# Patient Record
Sex: FEMALE | Race: WHITE
[De-identification: names, ages, dates, MRNs, and addresses within clinical notes are randomized per-mention and may not be internally consistent; named-entity substitution may affect disease eponyms.]

---

## 2018-11-08 ENCOUNTER — HOSPITAL ENCOUNTER (INPATIENT)
Dept: HOSPITAL 12 - REHAB | Age: 83
LOS: 13 days | Discharge: HOME | DRG: 948 | End: 2018-11-21
Attending: PHYSICAL MEDICINE & REHABILITATION | Admitting: PHYSICAL MEDICINE & REHABILITATION
Payer: MEDICARE

## 2018-11-08 VITALS — WEIGHT: 103 LBS | HEIGHT: 58 IN | BODY MASS INDEX: 21.62 KG/M2

## 2018-11-08 VITALS — DIASTOLIC BLOOD PRESSURE: 79 MMHG | SYSTOLIC BLOOD PRESSURE: 150 MMHG

## 2018-11-08 VITALS — DIASTOLIC BLOOD PRESSURE: 62 MMHG | SYSTOLIC BLOOD PRESSURE: 128 MMHG

## 2018-11-08 DIAGNOSIS — R53.81: Primary | ICD-10-CM

## 2018-11-08 DIAGNOSIS — M85.80: ICD-10-CM

## 2018-11-08 DIAGNOSIS — K44.9: ICD-10-CM

## 2018-11-08 DIAGNOSIS — M19.90: ICD-10-CM

## 2018-11-08 DIAGNOSIS — J84.9: ICD-10-CM

## 2018-11-08 DIAGNOSIS — M41.9: ICD-10-CM

## 2018-11-08 DIAGNOSIS — E03.9: ICD-10-CM

## 2018-11-08 DIAGNOSIS — I11.0: ICD-10-CM

## 2018-11-08 DIAGNOSIS — M81.0: ICD-10-CM

## 2018-11-08 DIAGNOSIS — H91.90: ICD-10-CM

## 2018-11-08 DIAGNOSIS — Z80.7: ICD-10-CM

## 2018-11-08 DIAGNOSIS — M54.5: ICD-10-CM

## 2018-11-08 DIAGNOSIS — Z90.11: ICD-10-CM

## 2018-11-08 DIAGNOSIS — Z87.891: ICD-10-CM

## 2018-11-08 DIAGNOSIS — I95.9: ICD-10-CM

## 2018-11-08 DIAGNOSIS — K21.9: ICD-10-CM

## 2018-11-08 DIAGNOSIS — Z83.3: ICD-10-CM

## 2018-11-08 DIAGNOSIS — E86.0: ICD-10-CM

## 2018-11-08 DIAGNOSIS — Z79.891: ICD-10-CM

## 2018-11-08 DIAGNOSIS — Z80.8: ICD-10-CM

## 2018-11-08 DIAGNOSIS — G43.909: ICD-10-CM

## 2018-11-08 DIAGNOSIS — Z82.0: ICD-10-CM

## 2018-11-08 DIAGNOSIS — I67.2: ICD-10-CM

## 2018-11-08 DIAGNOSIS — K58.0: ICD-10-CM

## 2018-11-08 DIAGNOSIS — Z96.652: ICD-10-CM

## 2018-11-08 DIAGNOSIS — G89.4: ICD-10-CM

## 2018-11-08 DIAGNOSIS — Z88.1: ICD-10-CM

## 2018-11-08 DIAGNOSIS — Z85.3: ICD-10-CM

## 2018-11-08 DIAGNOSIS — R32: ICD-10-CM

## 2018-11-08 DIAGNOSIS — Z90.710: ICD-10-CM

## 2018-11-08 DIAGNOSIS — E83.51: ICD-10-CM

## 2018-11-08 DIAGNOSIS — Z88.2: ICD-10-CM

## 2018-11-08 DIAGNOSIS — R53.1: ICD-10-CM

## 2018-11-08 DIAGNOSIS — R73.9: ICD-10-CM

## 2018-11-08 DIAGNOSIS — I50.30: ICD-10-CM

## 2018-11-08 DIAGNOSIS — I35.8: ICD-10-CM

## 2018-11-08 DIAGNOSIS — Z91.018: ICD-10-CM

## 2018-11-08 DIAGNOSIS — J98.4: ICD-10-CM

## 2018-11-08 PROCEDURE — A9150 MISC/EXPER NON-PRESCRIPT DRU: HCPCS

## 2018-11-08 RX ADMIN — IBUPROFEN PRN MG: 200 TABLET, SUGAR COATED ORAL at 19:30

## 2018-11-08 RX ADMIN — FOLIC ACID SCH MG: 1 TABLET ORAL at 20:36

## 2018-11-08 RX ADMIN — IBUPROFEN PRN MG: 200 TABLET, SUGAR COATED ORAL at 23:18

## 2018-11-08 RX ADMIN — LOSARTAN POTASSIUM SCH MG: 50 TABLET, FILM COATED ORAL at 20:37

## 2018-11-08 RX ADMIN — Medication SCH CAP: at 20:37

## 2018-11-08 NOTE — NUR
Admitted from home, per wheelchair with diagnosis of Chronic pain syndrome. Awake, alert x4. 
With pain lower back rated as 8/10, chronic. Not in any form of distress. No SOB or chest 
pains. Routine admission care done. Informed Dr. Lombardo of admission. Ordered AM labs and 
Ibuprofen 200 mg q4prn. Dr. Alex Young informed of admission, obtained medication 
reconciliation. , continued home medications and ordered one PM dose for Cozaar and 
Cymbalta. Patient informed and oriented about unit and equipment.

## 2018-11-08 NOTE — NUR
Patient received in bed. AAO X4. No acute distress or SOB noted. Has hearing difficulty. 
Able to makes needs known. On room air. Brief assessment done. VS stable. No Complain of 
pain at this time, but asked for around the clock pain medication to prevent pain. Safety 
measures maintained. Bed in low position, brake on, side rails upx2. Call light and personal 
belongings within reach. Continue to monitor.

## 2018-11-09 VITALS — SYSTOLIC BLOOD PRESSURE: 133 MMHG | DIASTOLIC BLOOD PRESSURE: 76 MMHG

## 2018-11-09 VITALS — SYSTOLIC BLOOD PRESSURE: 126 MMHG | DIASTOLIC BLOOD PRESSURE: 72 MMHG

## 2018-11-09 VITALS — DIASTOLIC BLOOD PRESSURE: 74 MMHG | SYSTOLIC BLOOD PRESSURE: 152 MMHG

## 2018-11-09 VITALS — SYSTOLIC BLOOD PRESSURE: 130 MMHG | DIASTOLIC BLOOD PRESSURE: 54 MMHG

## 2018-11-09 LAB
ALP SERPL-CCNC: 73 U/L (ref 50–136)
ALT SERPL W/O P-5'-P-CCNC: 15 U/L (ref 14–59)
AST SERPL-CCNC: 16 U/L (ref 15–37)
BASOPHILS # BLD AUTO: 0.1 K/UL (ref 0–8)
BASOPHILS NFR BLD AUTO: 0.7 % (ref 0–2)
BILIRUB SERPL-MCNC: 0.4 MG/DL (ref 0.2–1)
BUN SERPL-MCNC: 21 MG/DL (ref 7–18)
CHLORIDE SERPL-SCNC: 104 MMOL/L (ref 98–107)
CO2 SERPL-SCNC: 25 MMOL/L (ref 21–32)
CREAT SERPL-MCNC: 0.8 MG/DL (ref 0.6–1.3)
EOSINOPHIL # BLD AUTO: 0.1 K/UL (ref 0–0.7)
EOSINOPHIL NFR BLD AUTO: 1.3 % (ref 0–7)
GLUCOSE SERPL-MCNC: 113 MG/DL (ref 74–106)
HCT VFR BLD AUTO: 37.5 % (ref 31.2–41.9)
HGB BLD-MCNC: 12.9 G/DL (ref 10.9–14.3)
LYMPHOCYTES # BLD AUTO: 2 K/UL (ref 20–40)
LYMPHOCYTES NFR BLD AUTO: 19.7 % (ref 20.5–51.5)
MCH RBC QN AUTO: 29.1 UUG (ref 24.7–32.8)
MCHC RBC AUTO-ENTMCNC: 34 G/DL (ref 32.3–35.6)
MCV RBC AUTO: 84.9 FL (ref 75.5–95.3)
MONOCYTES # BLD AUTO: 0.7 K/UL (ref 2–10)
MONOCYTES NFR BLD AUTO: 6.8 % (ref 0–11)
NEUTROPHILS # BLD AUTO: 7.2 K/UL (ref 1.8–8.9)
NEUTROPHILS NFR BLD AUTO: 71.5 % (ref 38.5–71.5)
PLATELET # BLD AUTO: 423 K/UL (ref 179–408)
POTASSIUM SERPL-SCNC: 3.9 MMOL/L (ref 3.5–5.1)
RBC # BLD AUTO: 4.42 MIL/UL (ref 3.63–4.92)
WBC # BLD AUTO: 10 K/UL (ref 3.8–11.8)
WS STN SPEC: 7 G/DL (ref 6.4–8.2)

## 2018-11-09 RX ADMIN — Medication SCH MG: at 21:05

## 2018-11-09 RX ADMIN — IBUPROFEN PRN MG: 200 TABLET, SUGAR COATED ORAL at 03:30

## 2018-11-09 RX ADMIN — FOLIC ACID SCH MG: 1 TABLET ORAL at 08:39

## 2018-11-09 RX ADMIN — LIDOCAINE SCH PATCH: 50 PATCH CUTANEOUS at 08:40

## 2018-11-09 RX ADMIN — ACETAMINOPHEN SCH MG: 325 TABLET ORAL at 16:08

## 2018-11-09 RX ADMIN — IBUPROFEN PRN MG: 200 TABLET, SUGAR COATED ORAL at 12:54

## 2018-11-09 RX ADMIN — LOSARTAN POTASSIUM SCH MG: 50 TABLET, FILM COATED ORAL at 08:39

## 2018-11-09 RX ADMIN — Medication SCH MCG: at 06:12

## 2018-11-09 RX ADMIN — IBUPROFEN PRN MG: 200 TABLET, SUGAR COATED ORAL at 18:03

## 2018-11-09 RX ADMIN — Medication SCH TAB: at 08:40

## 2018-11-09 RX ADMIN — LOSARTAN POTASSIUM SCH MG: 50 TABLET, FILM COATED ORAL at 21:05

## 2018-11-09 RX ADMIN — Medication SCH CAP: at 08:36

## 2018-11-09 RX ADMIN — PREGABALIN SCH MG: 25 CAPSULE ORAL at 21:05

## 2018-11-09 RX ADMIN — IBUPROFEN PRN MG: 200 TABLET, SUGAR COATED ORAL at 08:51

## 2018-11-09 RX ADMIN — Medication SCH CAP: at 16:08

## 2018-11-09 RX ADMIN — Medication SCH CAP: at 13:05

## 2018-11-09 RX ADMIN — Medication SCH MG: at 08:39

## 2018-11-09 RX ADMIN — PANTOPRAZOLE SODIUM SCH MG: 40 TABLET, DELAYED RELEASE ORAL at 06:12

## 2018-11-09 RX ADMIN — FOLIC ACID SCH MG: 1 TABLET ORAL at 16:09

## 2018-11-09 NOTE — NUR
Received patient awake, alert x4. With chronic pain over upper and lower back. Not in any 
form of distress. With occasional dyspnea. No chest pains noted. Morning care done. 
Encouraged to call for needs.

## 2018-11-09 NOTE — NUR
Seen and examined by Dr Young, informed Dr about patient requesting for sleeping 
pills. . said he will put ordered in and discontinues Mobic medication. Seen and examined 
by Dr Wang, Chest X ray done.

## 2018-11-09 NOTE — NUR
Recieved patient lying on bed .Awake Alert Oriented x3 .Able to make needs known .Verbally 
responsive to the staff. No sob no distress noted. V/S taken and recorded with in normal 
range.Due meds given as order . Tolerated well.Continent both bladder and bowel function 
.Assisted to go to the bathroom. Kept clean and dry. Call light and other belongings with in 
reach at all times .All needs met and attended.

## 2018-11-10 VITALS — SYSTOLIC BLOOD PRESSURE: 101 MMHG | DIASTOLIC BLOOD PRESSURE: 40 MMHG

## 2018-11-10 VITALS — DIASTOLIC BLOOD PRESSURE: 63 MMHG | SYSTOLIC BLOOD PRESSURE: 120 MMHG

## 2018-11-10 VITALS — SYSTOLIC BLOOD PRESSURE: 129 MMHG | DIASTOLIC BLOOD PRESSURE: 72 MMHG

## 2018-11-10 VITALS — DIASTOLIC BLOOD PRESSURE: 66 MMHG | SYSTOLIC BLOOD PRESSURE: 125 MMHG

## 2018-11-10 LAB
BASE EXCESS BLDA CALC-SCNC: -5.4 MMOL/L
HCO3 BLDA-SCNC: 17.6 MMOL/L
HGB BLDA OXIMETRY-MCNC: 13.5 G/DL (ref 12–16)
INHALED O2 CONCENTRATION: 21 %
PCO2 TEMP ADJ BLDA: 28 MMHG (ref 35–45)
PH TEMP ADJ BLDA: 7.42 [PH] (ref 7.35–7.45)
PO2 TEMP ADJ BLDA: 88.2 MMHG (ref 75–100)
SPECIMEN DRAWN FROM PATIENT: (no result)
VENTILATION MODE VENT: (no result)

## 2018-11-10 RX ADMIN — ACETAMINOPHEN SCH MG: 325 TABLET ORAL at 16:48

## 2018-11-10 RX ADMIN — FOLIC ACID SCH MG: 1 TABLET ORAL at 08:19

## 2018-11-10 RX ADMIN — PREGABALIN SCH MG: 25 CAPSULE ORAL at 21:13

## 2018-11-10 RX ADMIN — Medication PRN MG: at 20:58

## 2018-11-10 RX ADMIN — LOSARTAN POTASSIUM SCH MG: 50 TABLET, FILM COATED ORAL at 08:19

## 2018-11-10 RX ADMIN — Medication SCH MG: at 08:18

## 2018-11-10 RX ADMIN — LIDOCAINE SCH PATCH: 50 PATCH CUTANEOUS at 08:20

## 2018-11-10 RX ADMIN — Medication SCH CAP: at 08:20

## 2018-11-10 RX ADMIN — IBUPROFEN PRN MG: 200 TABLET, SUGAR COATED ORAL at 16:48

## 2018-11-10 RX ADMIN — Medication PRN MG: at 00:17

## 2018-11-10 RX ADMIN — PANTOPRAZOLE SODIUM SCH MG: 40 TABLET, DELAYED RELEASE ORAL at 06:04

## 2018-11-10 RX ADMIN — Medication SCH CAP: at 16:48

## 2018-11-10 RX ADMIN — Medication SCH TAB: at 08:18

## 2018-11-10 RX ADMIN — ACETAMINOPHEN SCH MG: 325 TABLET ORAL at 08:18

## 2018-11-10 RX ADMIN — Medication SCH MG: at 20:58

## 2018-11-10 RX ADMIN — PREGABALIN SCH MG: 25 CAPSULE ORAL at 13:08

## 2018-11-10 RX ADMIN — LOSARTAN POTASSIUM SCH MG: 50 TABLET, FILM COATED ORAL at 20:58

## 2018-11-10 RX ADMIN — IBUPROFEN PRN MG: 200 TABLET, SUGAR COATED ORAL at 10:24

## 2018-11-10 RX ADMIN — FOLIC ACID SCH MG: 1 TABLET ORAL at 16:48

## 2018-11-10 RX ADMIN — Medication SCH CAP: at 13:08

## 2018-11-10 RX ADMIN — Medication SCH MCG: at 06:04

## 2018-11-10 RX ADMIN — PREGABALIN SCH MG: 25 CAPSULE ORAL at 06:04

## 2018-11-10 RX ADMIN — ACETAMINOPHEN SCH MG: 325 TABLET ORAL at 13:08

## 2018-11-10 NOTE — NUR
Received pt at the beginning of shift sleeping comfortably in bed. Aroused easily when 
alerted by name. AAO x4. No acute distress noted. No c/o pain or discomfort. Held Cozaar due 
to decreased /40. Safety measures maintained. Call light and personal belongings 
within reach. Will continue to monitor.

## 2018-11-10 NOTE — NUR
Received pt. in bed with eyes open. Pt. denies CP or SOB. No c/o pain or discomfort at this 
time, remain comfortable. Pt. A/OX4 able to make her needs known. All due AM medications 
given as ordered and tolerated well. All pt. needs attended promptly. Safety and fall 
precaution in place. Call light and all frequently used items within pt. reach. Will 
continue to monitor accordingly.

## 2018-11-10 NOTE — NUR
Nursing EOS note: No significant change during this shift. Pt. A/OX4 with no changes in 
cognition. Able to make her needs known. Pt. provided a copy of her advanced directive, 
place copy in pt. chart. All due medications given and tolerated well. Pt. seen by 
pulmonologist today, relayed ABG result to MD with no new order. Pt. on RA and tolerating 
well. No new skin condition noted, kept pt. clean, dry and comfortable. All pt. needs 
attended and met promptly. Safety measure and fall precaution in place. Call light and all 
frequently used items in reach. Will endorse to oncoming shift accordingly.

## 2018-11-11 VITALS — SYSTOLIC BLOOD PRESSURE: 114 MMHG | DIASTOLIC BLOOD PRESSURE: 51 MMHG

## 2018-11-11 VITALS — DIASTOLIC BLOOD PRESSURE: 78 MMHG | SYSTOLIC BLOOD PRESSURE: 145 MMHG

## 2018-11-11 VITALS — DIASTOLIC BLOOD PRESSURE: 73 MMHG | SYSTOLIC BLOOD PRESSURE: 118 MMHG

## 2018-11-11 VITALS — SYSTOLIC BLOOD PRESSURE: 151 MMHG | DIASTOLIC BLOOD PRESSURE: 92 MMHG

## 2018-11-11 RX ADMIN — Medication SCH MG: at 21:01

## 2018-11-11 RX ADMIN — Medication SCH MG: at 09:20

## 2018-11-11 RX ADMIN — LOSARTAN POTASSIUM SCH MG: 50 TABLET, FILM COATED ORAL at 09:21

## 2018-11-11 RX ADMIN — Medication SCH TAB: at 09:20

## 2018-11-11 RX ADMIN — PANTOPRAZOLE SODIUM SCH MG: 40 TABLET, DELAYED RELEASE ORAL at 06:20

## 2018-11-11 RX ADMIN — Medication SCH CAP: at 17:29

## 2018-11-11 RX ADMIN — Medication PRN MG: at 21:01

## 2018-11-11 RX ADMIN — PREGABALIN SCH MG: 25 CAPSULE ORAL at 21:00

## 2018-11-11 RX ADMIN — FOLIC ACID SCH MG: 1 TABLET ORAL at 09:21

## 2018-11-11 RX ADMIN — ACETAMINOPHEN SCH MG: 325 TABLET ORAL at 09:21

## 2018-11-11 RX ADMIN — IBUPROFEN PRN MG: 200 TABLET, SUGAR COATED ORAL at 13:15

## 2018-11-11 RX ADMIN — FOLIC ACID SCH MG: 1 TABLET ORAL at 17:29

## 2018-11-11 RX ADMIN — ACETAMINOPHEN SCH MG: 325 TABLET ORAL at 13:12

## 2018-11-11 RX ADMIN — ACETAMINOPHEN SCH MG: 325 TABLET ORAL at 17:29

## 2018-11-11 RX ADMIN — LOSARTAN POTASSIUM SCH MG: 50 TABLET, FILM COATED ORAL at 21:00

## 2018-11-11 RX ADMIN — Medication SCH CAP: at 13:12

## 2018-11-11 RX ADMIN — LIDOCAINE SCH PATCH: 50 PATCH CUTANEOUS at 09:23

## 2018-11-11 RX ADMIN — Medication SCH MCG: at 06:20

## 2018-11-11 RX ADMIN — PREGABALIN SCH MG: 25 CAPSULE ORAL at 06:20

## 2018-11-11 RX ADMIN — Medication SCH CAP: at 09:22

## 2018-11-11 RX ADMIN — IBUPROFEN PRN MG: 200 TABLET, SUGAR COATED ORAL at 17:30

## 2018-11-11 RX ADMIN — PREGABALIN SCH MG: 25 CAPSULE ORAL at 13:12

## 2018-11-11 NOTE — NUR
Received pt resting in bed. AAO x4. No acute distress noted. C/o pain due to tiredness from 
activity during the day. VSS. Pt has difficulty hearing. Safety measures maintained. Bed 
alarm on. Call light and personal belongings within reach. Will continue to monitor.

## 2018-11-12 VITALS — DIASTOLIC BLOOD PRESSURE: 70 MMHG | SYSTOLIC BLOOD PRESSURE: 115 MMHG

## 2018-11-12 VITALS — DIASTOLIC BLOOD PRESSURE: 51 MMHG | SYSTOLIC BLOOD PRESSURE: 126 MMHG

## 2018-11-12 RX ADMIN — Medication PRN MG: at 22:01

## 2018-11-12 RX ADMIN — PREGABALIN SCH MG: 25 CAPSULE ORAL at 13:36

## 2018-11-12 RX ADMIN — Medication SCH CAP: at 13:36

## 2018-11-12 RX ADMIN — LIDOCAINE SCH PATCH: 50 PATCH CUTANEOUS at 09:21

## 2018-11-12 RX ADMIN — Medication SCH MG: at 21:06

## 2018-11-12 RX ADMIN — IBUPROFEN PRN MG: 200 TABLET, SUGAR COATED ORAL at 02:49

## 2018-11-12 RX ADMIN — ACETAMINOPHEN SCH MG: 325 TABLET ORAL at 09:21

## 2018-11-12 RX ADMIN — Medication SCH MG: at 09:21

## 2018-11-12 RX ADMIN — PREGABALIN SCH MG: 25 CAPSULE ORAL at 06:01

## 2018-11-12 RX ADMIN — ACETAMINOPHEN SCH MG: 325 TABLET ORAL at 13:36

## 2018-11-12 RX ADMIN — PREGABALIN SCH MG: 25 CAPSULE ORAL at 21:07

## 2018-11-12 RX ADMIN — FOLIC ACID SCH MG: 1 TABLET ORAL at 17:32

## 2018-11-12 RX ADMIN — LOSARTAN POTASSIUM SCH MG: 50 TABLET, FILM COATED ORAL at 21:07

## 2018-11-12 RX ADMIN — ACETAMINOPHEN SCH MG: 325 TABLET ORAL at 17:31

## 2018-11-12 RX ADMIN — Medication SCH MCG: at 06:01

## 2018-11-12 RX ADMIN — Medication SCH CAP: at 09:21

## 2018-11-12 RX ADMIN — FOLIC ACID SCH MG: 1 TABLET ORAL at 09:21

## 2018-11-12 RX ADMIN — PANTOPRAZOLE SODIUM SCH MG: 40 TABLET, DELAYED RELEASE ORAL at 06:01

## 2018-11-12 RX ADMIN — LOSARTAN POTASSIUM SCH MG: 50 TABLET, FILM COATED ORAL at 09:21

## 2018-11-12 RX ADMIN — IBUPROFEN PRN MG: 200 TABLET, SUGAR COATED ORAL at 09:20

## 2018-11-12 RX ADMIN — IBUPROFEN PRN MG: 200 TABLET, SUGAR COATED ORAL at 13:36

## 2018-11-12 RX ADMIN — Medication SCH CAP: at 17:31

## 2018-11-12 RX ADMIN — Medication SCH TAB: at 09:21

## 2018-11-12 RX ADMIN — IBUPROFEN PRN MG: 200 TABLET, SUGAR COATED ORAL at 17:31

## 2018-11-12 NOTE — NUR
patient stable this shift. VSS. no s/s acute distress. pain controlled with routine tylenol 
and PRN motrin. intake and output sufficient. will endorse to oncoming shift.

## 2018-11-13 VITALS — SYSTOLIC BLOOD PRESSURE: 112 MMHG | DIASTOLIC BLOOD PRESSURE: 61 MMHG

## 2018-11-13 VITALS — DIASTOLIC BLOOD PRESSURE: 56 MMHG | SYSTOLIC BLOOD PRESSURE: 143 MMHG

## 2018-11-13 VITALS — SYSTOLIC BLOOD PRESSURE: 107 MMHG | DIASTOLIC BLOOD PRESSURE: 59 MMHG

## 2018-11-13 LAB
BASOPHILS # BLD AUTO: 0.1 K/UL (ref 0–8)
BASOPHILS NFR BLD AUTO: 0.7 % (ref 0–2)
BUN SERPL-MCNC: 26 MG/DL (ref 7–18)
CHLORIDE SERPL-SCNC: 108 MMOL/L (ref 98–107)
CO2 SERPL-SCNC: 24 MMOL/L (ref 21–32)
CREAT SERPL-MCNC: 0.9 MG/DL (ref 0.6–1.3)
EOSINOPHIL # BLD AUTO: 0.3 K/UL (ref 0–0.7)
EOSINOPHIL NFR BLD AUTO: 3.5 % (ref 0–7)
GLUCOSE SERPL-MCNC: 127 MG/DL (ref 74–106)
HCT VFR BLD AUTO: 37.1 % (ref 31.2–41.9)
HGB BLD-MCNC: 12.6 G/DL (ref 10.9–14.3)
LYMPHOCYTES # BLD AUTO: 2.1 K/UL (ref 20–40)
LYMPHOCYTES NFR BLD AUTO: 22.4 % (ref 20.5–51.5)
MCH RBC QN AUTO: 29.5 UUG (ref 24.7–32.8)
MCHC RBC AUTO-ENTMCNC: 34 G/DL (ref 32.3–35.6)
MCV RBC AUTO: 87.3 FL (ref 75.5–95.3)
MONOCYTES # BLD AUTO: 0.6 K/UL (ref 2–10)
MONOCYTES NFR BLD AUTO: 6.1 % (ref 0–11)
NEUTROPHILS # BLD AUTO: 6.3 K/UL (ref 1.8–8.9)
NEUTROPHILS NFR BLD AUTO: 67.3 % (ref 38.5–71.5)
PLATELET # BLD AUTO: 425 K/UL (ref 179–408)
POTASSIUM SERPL-SCNC: 4.3 MMOL/L (ref 3.5–5.1)
RBC # BLD AUTO: 4.26 MIL/UL (ref 3.63–4.92)
WBC # BLD AUTO: 9.3 K/UL (ref 3.8–11.8)

## 2018-11-13 RX ADMIN — Medication SCH MG: at 08:32

## 2018-11-13 RX ADMIN — LIDOCAINE SCH PATCH: 50 PATCH CUTANEOUS at 08:33

## 2018-11-13 RX ADMIN — FOLIC ACID SCH MG: 1 TABLET ORAL at 08:33

## 2018-11-13 RX ADMIN — FOLIC ACID SCH MG: 1 TABLET ORAL at 16:53

## 2018-11-13 RX ADMIN — Medication SCH TAB: at 08:33

## 2018-11-13 RX ADMIN — Medication SCH CAP: at 13:22

## 2018-11-13 RX ADMIN — Medication SCH MCG: at 06:27

## 2018-11-13 RX ADMIN — Medication SCH MG: at 21:00

## 2018-11-13 RX ADMIN — PANTOPRAZOLE SODIUM SCH MG: 40 TABLET, DELAYED RELEASE ORAL at 06:27

## 2018-11-13 RX ADMIN — ACETAMINOPHEN SCH MG: 325 TABLET ORAL at 13:22

## 2018-11-13 RX ADMIN — LOSARTAN POTASSIUM SCH MG: 50 TABLET, FILM COATED ORAL at 08:33

## 2018-11-13 RX ADMIN — IBUPROFEN PRN MG: 200 TABLET, SUGAR COATED ORAL at 22:23

## 2018-11-13 RX ADMIN — IBUPROFEN PRN MG: 200 TABLET, SUGAR COATED ORAL at 17:37

## 2018-11-13 RX ADMIN — MAGNESIUM HYDROXIDE PRN ML: 400 SUSPENSION ORAL at 10:42

## 2018-11-13 RX ADMIN — PREGABALIN SCH MG: 25 CAPSULE ORAL at 05:50

## 2018-11-13 RX ADMIN — IBUPROFEN PRN MG: 200 TABLET, SUGAR COATED ORAL at 13:25

## 2018-11-13 RX ADMIN — Medication PRN MG: at 22:30

## 2018-11-13 RX ADMIN — Medication SCH CAP: at 08:32

## 2018-11-13 RX ADMIN — IBUPROFEN PRN MG: 200 TABLET, SUGAR COATED ORAL at 02:22

## 2018-11-13 RX ADMIN — PREGABALIN SCH MG: 25 CAPSULE ORAL at 13:22

## 2018-11-13 RX ADMIN — ACETAMINOPHEN SCH MG: 325 TABLET ORAL at 16:52

## 2018-11-13 RX ADMIN — ACETAMINOPHEN SCH MG: 325 TABLET ORAL at 08:32

## 2018-11-13 RX ADMIN — Medication SCH MG: at 21:01

## 2018-11-13 RX ADMIN — Medication SCH CAP: at 16:52

## 2018-11-13 RX ADMIN — PREGABALIN SCH MG: 25 CAPSULE ORAL at 21:01

## 2018-11-13 NOTE — NUR
End of the shift note

Patient was stable throughout the shift. VS stable. No sign of acute distress or SOB noted. 
On room air. Complained of pain. Pain medication and other Medication given as ordered. 
Safety measures maintained. Fall precaution maintained. Diaper changed, kept her clean and 
dry.  All needs attended promptly. Bed brake and alarm on, side rails upx2. Call light and 
personal belonging within reach. Continue to monitor and will endorse to the day shift nurse 
accordingly.

## 2018-11-14 VITALS — SYSTOLIC BLOOD PRESSURE: 115 MMHG | DIASTOLIC BLOOD PRESSURE: 46 MMHG

## 2018-11-14 VITALS — DIASTOLIC BLOOD PRESSURE: 54 MMHG | SYSTOLIC BLOOD PRESSURE: 124 MMHG

## 2018-11-14 VITALS — SYSTOLIC BLOOD PRESSURE: 153 MMHG | DIASTOLIC BLOOD PRESSURE: 63 MMHG

## 2018-11-14 VITALS — DIASTOLIC BLOOD PRESSURE: 57 MMHG | SYSTOLIC BLOOD PRESSURE: 134 MMHG

## 2018-11-14 RX ADMIN — ACETAMINOPHEN SCH MG: 325 TABLET ORAL at 09:20

## 2018-11-14 RX ADMIN — Medication SCH CAP: at 09:20

## 2018-11-14 RX ADMIN — PANTOPRAZOLE SODIUM SCH MG: 40 TABLET, DELAYED RELEASE ORAL at 06:20

## 2018-11-14 RX ADMIN — LOSARTAN POTASSIUM SCH MG: 25 TABLET, FILM COATED ORAL at 21:02

## 2018-11-14 RX ADMIN — Medication SCH TAB: at 09:20

## 2018-11-14 RX ADMIN — IBUPROFEN PRN MG: 200 TABLET, SUGAR COATED ORAL at 09:22

## 2018-11-14 RX ADMIN — Medication SCH MG: at 09:21

## 2018-11-14 RX ADMIN — Medication SCH MCG: at 06:20

## 2018-11-14 RX ADMIN — Medication SCH CAP: at 16:41

## 2018-11-14 RX ADMIN — PREGABALIN SCH MG: 25 CAPSULE ORAL at 06:20

## 2018-11-14 RX ADMIN — ACETAMINOPHEN SCH MG: 325 TABLET ORAL at 16:41

## 2018-11-14 RX ADMIN — IBUPROFEN PRN MG: 200 TABLET, SUGAR COATED ORAL at 21:03

## 2018-11-14 RX ADMIN — IBUPROFEN PRN MG: 200 TABLET, SUGAR COATED ORAL at 14:06

## 2018-11-14 RX ADMIN — LIDOCAINE SCH PATCH: 50 PATCH CUTANEOUS at 09:26

## 2018-11-14 RX ADMIN — ACETAMINOPHEN SCH MG: 325 TABLET ORAL at 13:04

## 2018-11-14 RX ADMIN — Medication SCH CAP: at 13:04

## 2018-11-14 RX ADMIN — PREGABALIN SCH MG: 25 CAPSULE ORAL at 13:04

## 2018-11-14 RX ADMIN — Medication SCH MG: at 21:02

## 2018-11-14 RX ADMIN — FOLIC ACID SCH MG: 1 TABLET ORAL at 16:41

## 2018-11-14 RX ADMIN — LOSARTAN POTASSIUM SCH MG: 25 TABLET, FILM COATED ORAL at 09:21

## 2018-11-14 RX ADMIN — FOLIC ACID SCH MG: 1 TABLET ORAL at 09:20

## 2018-11-14 RX ADMIN — Medication SCH MG: at 21:01

## 2018-11-14 RX ADMIN — Medication SCH MG: at 09:20

## 2018-11-14 RX ADMIN — PREGABALIN SCH MG: 25 CAPSULE ORAL at 21:01

## 2018-11-14 RX ADMIN — Medication PRN MG: at 21:03

## 2018-11-14 NOTE — NUR
Patient awake, up on bed, having breakfast, not in any form of acute distress. No complain 
of any pain at this time. Assisted with her needs. Call light placed within reach.

## 2018-11-14 NOTE — NUR
resting in bed aaox4 needs attended. kept comfortable. admitted for chronic

pain syndrome. medicated with  pain as needed. relief noted. ambulates to

the BR with walker. voiding well. continent of bowel and bladder. fall precautions

maintained. siderails up for safety. call bell within reach.

## 2018-11-15 VITALS — DIASTOLIC BLOOD PRESSURE: 71 MMHG | SYSTOLIC BLOOD PRESSURE: 116 MMHG

## 2018-11-15 VITALS — DIASTOLIC BLOOD PRESSURE: 60 MMHG | SYSTOLIC BLOOD PRESSURE: 120 MMHG

## 2018-11-15 VITALS — DIASTOLIC BLOOD PRESSURE: 59 MMHG | SYSTOLIC BLOOD PRESSURE: 137 MMHG

## 2018-11-15 VITALS — DIASTOLIC BLOOD PRESSURE: 62 MMHG | SYSTOLIC BLOOD PRESSURE: 134 MMHG

## 2018-11-15 RX ADMIN — LOSARTAN POTASSIUM SCH MG: 25 TABLET, FILM COATED ORAL at 20:47

## 2018-11-15 RX ADMIN — ACETAMINOPHEN, ASPIRIN AND CAFFEINE PRN TAB: 250; 250; 65 TABLET, FILM COATED ORAL at 10:39

## 2018-11-15 RX ADMIN — FOLIC ACID SCH MG: 1 TABLET ORAL at 09:30

## 2018-11-15 RX ADMIN — PANTOPRAZOLE SODIUM SCH MG: 40 TABLET, DELAYED RELEASE ORAL at 06:05

## 2018-11-15 RX ADMIN — ACETAMINOPHEN SCH MG: 325 TABLET ORAL at 09:30

## 2018-11-15 RX ADMIN — Medication SCH CAP: at 16:03

## 2018-11-15 RX ADMIN — Medication PRN MG: at 20:46

## 2018-11-15 RX ADMIN — LOSARTAN POTASSIUM SCH MG: 25 TABLET, FILM COATED ORAL at 09:30

## 2018-11-15 RX ADMIN — LIDOCAINE SCH PATCH: 50 PATCH CUTANEOUS at 09:29

## 2018-11-15 RX ADMIN — Medication SCH MG: at 09:30

## 2018-11-15 RX ADMIN — Medication SCH MG: at 20:45

## 2018-11-15 RX ADMIN — PREGABALIN SCH MG: 25 CAPSULE ORAL at 13:32

## 2018-11-15 RX ADMIN — ACETAMINOPHEN SCH MG: 325 TABLET ORAL at 16:03

## 2018-11-15 RX ADMIN — IBUPROFEN PRN MG: 200 TABLET, SUGAR COATED ORAL at 20:47

## 2018-11-15 RX ADMIN — Medication SCH CAP: at 09:29

## 2018-11-15 RX ADMIN — Medication SCH CAP: at 13:32

## 2018-11-15 RX ADMIN — Medication SCH MCG: at 06:05

## 2018-11-15 RX ADMIN — ACETAMINOPHEN, ASPIRIN AND CAFFEINE PRN TAB: 250; 250; 65 TABLET, FILM COATED ORAL at 02:47

## 2018-11-15 RX ADMIN — FOLIC ACID SCH MG: 1 TABLET ORAL at 16:03

## 2018-11-15 RX ADMIN — ACETAMINOPHEN SCH MG: 325 TABLET ORAL at 13:33

## 2018-11-15 RX ADMIN — IBUPROFEN PRN MG: 200 TABLET, SUGAR COATED ORAL at 06:06

## 2018-11-15 RX ADMIN — PREGABALIN SCH MG: 25 CAPSULE ORAL at 06:05

## 2018-11-15 RX ADMIN — Medication SCH MG: at 20:46

## 2018-11-15 RX ADMIN — Medication SCH TAB: at 09:30

## 2018-11-15 NOTE — NUR
Received patient, awake, alert x4. Not in any form of distress. With garcía  over back and 
headache, routine Tylenol given. Will continue to monitor. Call light within reach.

## 2018-11-15 NOTE — NUR
admitted for chronic pain syndrome.aox4 no acute distress noted.

on pain management, meds given as directed. need attended. patient

also complained of headache, excedrin given. slight relief noted. 

OOB to the BR. voiding well. no BM noted this shift. kept comfortable.

will monitor patient. slept at short intervals last night.

## 2018-11-16 VITALS — SYSTOLIC BLOOD PRESSURE: 107 MMHG | DIASTOLIC BLOOD PRESSURE: 53 MMHG

## 2018-11-16 VITALS — SYSTOLIC BLOOD PRESSURE: 122 MMHG | DIASTOLIC BLOOD PRESSURE: 63 MMHG

## 2018-11-16 VITALS — SYSTOLIC BLOOD PRESSURE: 126 MMHG | DIASTOLIC BLOOD PRESSURE: 72 MMHG

## 2018-11-16 VITALS — DIASTOLIC BLOOD PRESSURE: 43 MMHG | SYSTOLIC BLOOD PRESSURE: 133 MMHG

## 2018-11-16 VITALS — SYSTOLIC BLOOD PRESSURE: 107 MMHG | DIASTOLIC BLOOD PRESSURE: 51 MMHG

## 2018-11-16 RX ADMIN — ACETAMINOPHEN, ASPIRIN AND CAFFEINE PRN TAB: 250; 250; 65 TABLET, FILM COATED ORAL at 20:22

## 2018-11-16 RX ADMIN — ACETAMINOPHEN SCH MG: 325 TABLET ORAL at 08:53

## 2018-11-16 RX ADMIN — ACETAMINOPHEN SCH MG: 325 TABLET ORAL at 12:09

## 2018-11-16 RX ADMIN — Medication SCH MG: at 20:23

## 2018-11-16 RX ADMIN — Medication PRN MG: at 20:22

## 2018-11-16 RX ADMIN — ACETAMINOPHEN, ASPIRIN AND CAFFEINE PRN TAB: 250; 250; 65 TABLET, FILM COATED ORAL at 06:10

## 2018-11-16 RX ADMIN — ACETAMINOPHEN SCH MG: 325 TABLET ORAL at 16:52

## 2018-11-16 RX ADMIN — LOSARTAN POTASSIUM SCH MG: 25 TABLET, FILM COATED ORAL at 20:23

## 2018-11-16 RX ADMIN — Medication SCH TAB: at 08:53

## 2018-11-16 RX ADMIN — IBUPROFEN PRN MG: 200 TABLET, SUGAR COATED ORAL at 23:03

## 2018-11-16 RX ADMIN — PANTOPRAZOLE SODIUM SCH MG: 40 TABLET, DELAYED RELEASE ORAL at 06:10

## 2018-11-16 RX ADMIN — LOSARTAN POTASSIUM SCH MG: 25 TABLET, FILM COATED ORAL at 08:53

## 2018-11-16 RX ADMIN — IBUPROFEN PRN MG: 200 TABLET, SUGAR COATED ORAL at 13:37

## 2018-11-16 RX ADMIN — Medication SCH CAP: at 12:09

## 2018-11-16 RX ADMIN — MAGNESIUM HYDROXIDE PRN ML: 400 SUSPENSION ORAL at 12:09

## 2018-11-16 RX ADMIN — FOLIC ACID SCH MG: 1 TABLET ORAL at 08:54

## 2018-11-16 RX ADMIN — Medication SCH CAP: at 16:52

## 2018-11-16 RX ADMIN — Medication SCH MCG: at 06:10

## 2018-11-16 RX ADMIN — Medication SCH CAP: at 08:52

## 2018-11-16 RX ADMIN — Medication SCH MG: at 08:53

## 2018-11-16 RX ADMIN — ACETAMINOPHEN, ASPIRIN AND CAFFEINE PRN TAB: 250; 250; 65 TABLET, FILM COATED ORAL at 13:37

## 2018-11-16 RX ADMIN — LIDOCAINE SCH PATCH: 50 PATCH CUTANEOUS at 08:52

## 2018-11-16 RX ADMIN — Medication SCH MG: at 08:54

## 2018-11-16 RX ADMIN — FOLIC ACID SCH MG: 1 TABLET ORAL at 16:52

## 2018-11-16 RX ADMIN — Medication SCH MG: at 20:21

## 2018-11-16 NOTE — NUR
Received pt resting in bed with eye mask on to help with headache. AAO x4. No acute distress 
noted. C/o headache, but pt stated that she feels 75% better after taking Imitrex this 
afternoon. PRN Excedrin given. Held Cozaar and Lopressor due to decreased /53. 
Provided teachings to report any s/s of hypotension/hypertension and also other concerns if 
there is any. Safety measures maintained. Call light and personal belongings within reach. 
Will continue to monitor.

## 2018-11-16 NOTE — NUR
Patient continue complaining of headache despite giving pain medication as ordered for PRN. 
MD Mahmood notified and ordered Sumatriptan 50mg one time. given around 3pm. seen and 
examined by MD Lombardo. ordered diazepam 5mg daily as PRN for anxiety.  will continue 
monitor

## 2018-11-16 NOTE — NUR
Patient continue on therapy for ambulation and unsteady gait. Continue pain management with 
good effect. not in distress.no complaint of pain/discomfort as of this time. will continue 
monitor

## 2018-11-16 NOTE — NUR
Patient refuse therapy despite of encouragement and education. Patient complaint of 
headache, verbalize cannot do therapy. Sumatriptan 50mg one time given prior to therapy.

## 2018-11-17 VITALS — DIASTOLIC BLOOD PRESSURE: 58 MMHG | SYSTOLIC BLOOD PRESSURE: 139 MMHG

## 2018-11-17 VITALS — DIASTOLIC BLOOD PRESSURE: 56 MMHG | SYSTOLIC BLOOD PRESSURE: 129 MMHG

## 2018-11-17 VITALS — DIASTOLIC BLOOD PRESSURE: 71 MMHG | SYSTOLIC BLOOD PRESSURE: 115 MMHG

## 2018-11-17 VITALS — SYSTOLIC BLOOD PRESSURE: 136 MMHG | DIASTOLIC BLOOD PRESSURE: 48 MMHG

## 2018-11-17 RX ADMIN — ACETAMINOPHEN SCH MG: 325 TABLET ORAL at 16:39

## 2018-11-17 RX ADMIN — Medication SCH MCG: at 06:09

## 2018-11-17 RX ADMIN — Medication SCH MG: at 23:08

## 2018-11-17 RX ADMIN — ACETAMINOPHEN, ASPIRIN AND CAFFEINE PRN TAB: 250; 250; 65 TABLET, FILM COATED ORAL at 02:22

## 2018-11-17 RX ADMIN — Medication SCH CAP: at 12:15

## 2018-11-17 RX ADMIN — FOLIC ACID SCH MG: 1 TABLET ORAL at 08:34

## 2018-11-17 RX ADMIN — LOSARTAN POTASSIUM SCH MG: 25 TABLET, FILM COATED ORAL at 08:35

## 2018-11-17 RX ADMIN — Medication SCH MG: at 08:34

## 2018-11-17 RX ADMIN — Medication SCH MG: at 21:21

## 2018-11-17 RX ADMIN — SUMATRIPTAN SUCCINATE PRN MG: 50 TABLET, FILM COATED ORAL at 12:15

## 2018-11-17 RX ADMIN — Medication PRN MG: at 21:21

## 2018-11-17 RX ADMIN — LIDOCAINE SCH PATCH: 50 PATCH CUTANEOUS at 08:35

## 2018-11-17 RX ADMIN — Medication SCH CAP: at 08:29

## 2018-11-17 RX ADMIN — Medication SCH TAB: at 08:34

## 2018-11-17 RX ADMIN — Medication SCH CAP: at 16:39

## 2018-11-17 RX ADMIN — LOPERAMIDE HYDROCHLORIDE PRN MG: 2 CAPSULE ORAL at 23:02

## 2018-11-17 RX ADMIN — LOSARTAN POTASSIUM SCH MG: 25 TABLET, FILM COATED ORAL at 21:21

## 2018-11-17 RX ADMIN — Medication SCH MG: at 17:15

## 2018-11-17 RX ADMIN — PREGABALIN SCH MG: 25 CAPSULE ORAL at 21:22

## 2018-11-17 RX ADMIN — SUMATRIPTAN SUCCINATE PRN MG: 50 TABLET, FILM COATED ORAL at 16:39

## 2018-11-17 RX ADMIN — FOLIC ACID SCH MG: 1 TABLET ORAL at 16:39

## 2018-11-17 RX ADMIN — PANTOPRAZOLE SODIUM SCH MG: 40 TABLET, DELAYED RELEASE ORAL at 06:09

## 2018-11-17 RX ADMIN — ACETAMINOPHEN SCH MG: 325 TABLET ORAL at 12:15

## 2018-11-17 RX ADMIN — IBUPROFEN PRN MG: 200 TABLET, SUGAR COATED ORAL at 06:09

## 2018-11-17 RX ADMIN — ACETAMINOPHEN SCH MG: 325 TABLET ORAL at 08:34

## 2018-11-17 RX ADMIN — LOPERAMIDE HYDROCHLORIDE PRN MG: 2 CAPSULE ORAL at 18:32

## 2018-11-17 NOTE — NUR
Hand-Off Report received from Jen YANG. Pt awake and AOX4. Chief complaint at this time 
is frequent loose stools. Continue to monitor and given immodium q4/prn as needed. Pt only 
lying on right side with pillow between knees at all times. Pt moves well from side to side. 
Stated continuous headache frontal and bilateral temporal area. Also stated chronic pain 
from Scoliosis mid to lower back. Stated usual relief from Lyrica.  Cont to monitor 
effectiveness of scheduled meds.

## 2018-11-17 NOTE — NUR
Patient continue complaining of headache. verbalize" I want the migraine magic pill". MD Corbin notified and ordered Imetrex QID PRN. MD Lombardo will continue monitor

## 2018-11-17 NOTE — NUR
Patient seen and examined by MD Garcia. ordered change metoprolol 50mg every 12 hours to 
metoprolol 25mg every 6 hours for headache management. will continue monitor

## 2018-11-17 NOTE — NUR
Patient had 4x LBM. no foul smell noted. MD Lombardo aware. ordered imodium 2mg every 4hours 
for diarrhea

## 2018-11-18 VITALS — DIASTOLIC BLOOD PRESSURE: 37 MMHG | SYSTOLIC BLOOD PRESSURE: 81 MMHG

## 2018-11-18 VITALS — SYSTOLIC BLOOD PRESSURE: 102 MMHG | DIASTOLIC BLOOD PRESSURE: 40 MMHG

## 2018-11-18 VITALS — DIASTOLIC BLOOD PRESSURE: 49 MMHG | SYSTOLIC BLOOD PRESSURE: 106 MMHG

## 2018-11-18 VITALS — SYSTOLIC BLOOD PRESSURE: 97 MMHG | DIASTOLIC BLOOD PRESSURE: 34 MMHG

## 2018-11-18 LAB
BUN SERPL-MCNC: 23 MG/DL (ref 7–18)
CHLORIDE SERPL-SCNC: 103 MMOL/L (ref 98–107)
CO2 SERPL-SCNC: 26 MMOL/L (ref 21–32)
CREAT SERPL-MCNC: 0.9 MG/DL (ref 0.6–1.3)
GLUCOSE SERPL-MCNC: 108 MG/DL (ref 74–106)
MAGNESIUM SERPL-MCNC: 2.3 MG/DL (ref 1.8–2.4)
POTASSIUM SERPL-SCNC: 4.5 MMOL/L (ref 3.5–5.1)

## 2018-11-18 RX ADMIN — LIDOCAINE SCH PATCH: 50 PATCH CUTANEOUS at 08:27

## 2018-11-18 RX ADMIN — Medication SCH MCG: at 06:44

## 2018-11-18 RX ADMIN — IBUPROFEN PRN MG: 200 TABLET, SUGAR COATED ORAL at 17:42

## 2018-11-18 RX ADMIN — PREGABALIN SCH MG: 25 CAPSULE ORAL at 06:43

## 2018-11-18 RX ADMIN — ACETAMINOPHEN SCH MG: 325 TABLET ORAL at 12:44

## 2018-11-18 RX ADMIN — Medication SCH CAP: at 08:27

## 2018-11-18 RX ADMIN — PREGABALIN SCH MG: 25 CAPSULE ORAL at 23:01

## 2018-11-18 RX ADMIN — Medication SCH CAP: at 12:44

## 2018-11-18 RX ADMIN — ACETAMINOPHEN SCH MG: 325 TABLET ORAL at 17:38

## 2018-11-18 RX ADMIN — LOPERAMIDE HYDROCHLORIDE PRN MG: 2 CAPSULE ORAL at 06:43

## 2018-11-18 RX ADMIN — FOLIC ACID SCH MG: 1 TABLET ORAL at 17:39

## 2018-11-18 RX ADMIN — Medication SCH MG: at 17:38

## 2018-11-18 RX ADMIN — SUMATRIPTAN SUCCINATE PRN MG: 50 TABLET, FILM COATED ORAL at 06:44

## 2018-11-18 RX ADMIN — PANTOPRAZOLE SODIUM SCH MG: 40 TABLET, DELAYED RELEASE ORAL at 06:43

## 2018-11-18 RX ADMIN — PREGABALIN SCH MG: 25 CAPSULE ORAL at 13:07

## 2018-11-18 RX ADMIN — SUMATRIPTAN SUCCINATE PRN MG: 50 TABLET, FILM COATED ORAL at 10:47

## 2018-11-18 RX ADMIN — LOPERAMIDE HYDROCHLORIDE PRN MG: 2 CAPSULE ORAL at 17:38

## 2018-11-18 RX ADMIN — LOSARTAN POTASSIUM SCH MG: 25 TABLET, FILM COATED ORAL at 08:26

## 2018-11-18 RX ADMIN — Medication SCH MG: at 12:00

## 2018-11-18 RX ADMIN — Medication SCH CAP: at 17:38

## 2018-11-18 RX ADMIN — Medication SCH MG: at 08:25

## 2018-11-18 RX ADMIN — FOLIC ACID SCH MG: 1 TABLET ORAL at 08:25

## 2018-11-18 RX ADMIN — ACETAMINOPHEN SCH MG: 325 TABLET ORAL at 08:25

## 2018-11-18 RX ADMIN — Medication SCH MG: at 06:00

## 2018-11-18 RX ADMIN — Medication SCH MG: at 23:01

## 2018-11-18 RX ADMIN — LOPERAMIDE HYDROCHLORIDE PRN MG: 2 CAPSULE ORAL at 01:52

## 2018-11-18 RX ADMIN — Medication SCH TAB: at 08:25

## 2018-11-18 NOTE — NUR
Nursing EOS note: No significant change during this shift. Pt. A/OX4 with no changes in 
cognition. Able to make her needs known. All due medications given and tolerated well. X1 
loose BM, PRN Imodium administered as ordered. Pt. seen by cardiologist today, with new 
order. Pt. on RA and tolerating well. No new skin condition noted, kept pt. clean, dry and 
comfortable. All pt. needs attended and met promptly. Safety measure and fall precaution in 
place. Call light and all frequently used items in reach. Will endorse to oncoming shift 
accordingly.

## 2018-11-18 NOTE — NUR
Received pt. in bed with eyes open. Pt. A/OX4 verbally responsive and able to make her needs 
known. Pt. denies CP or SOB. No c/o pain or discomfort at this time, remain comfortable. No 
episodes of watery stools at this time. All due AM medications given as ordered and 
tolerated well. All pt. needs attended promptly. Safety and fall precaution in place. Call 
light and all frequently used items within pt. reach. Will continue to monitor accordingly.

## 2018-11-18 NOTE — NUR
Diarrhea stools totaled X6 this shift. Immodium given X3. Stools progressively getting more 
formed and less watery

## 2018-11-18 NOTE — NUR
Pt awake in bed. AOX4. Denies chest pain or SOB.  94 18 81/59. Cont. to monitor VS. 
Will hold B/P meds per Doctor protocol for SBP less than 100. No further loose stools.

## 2018-11-19 VITALS — SYSTOLIC BLOOD PRESSURE: 117 MMHG | DIASTOLIC BLOOD PRESSURE: 54 MMHG

## 2018-11-19 VITALS — DIASTOLIC BLOOD PRESSURE: 46 MMHG | SYSTOLIC BLOOD PRESSURE: 84 MMHG

## 2018-11-19 VITALS — SYSTOLIC BLOOD PRESSURE: 112 MMHG | DIASTOLIC BLOOD PRESSURE: 38 MMHG

## 2018-11-19 VITALS — DIASTOLIC BLOOD PRESSURE: 48 MMHG | SYSTOLIC BLOOD PRESSURE: 98 MMHG

## 2018-11-19 VITALS — SYSTOLIC BLOOD PRESSURE: 96 MMHG | DIASTOLIC BLOOD PRESSURE: 44 MMHG

## 2018-11-19 VITALS — DIASTOLIC BLOOD PRESSURE: 38 MMHG | SYSTOLIC BLOOD PRESSURE: 89 MMHG

## 2018-11-19 RX ADMIN — FOLIC ACID SCH MG: 1 TABLET ORAL at 08:56

## 2018-11-19 RX ADMIN — Medication SCH MG: at 23:12

## 2018-11-19 RX ADMIN — ACETAMINOPHEN SCH MG: 325 TABLET ORAL at 08:56

## 2018-11-19 RX ADMIN — Medication SCH MG: at 20:55

## 2018-11-19 RX ADMIN — Medication SCH MG: at 08:56

## 2018-11-19 RX ADMIN — FOLIC ACID SCH MG: 1 TABLET ORAL at 17:09

## 2018-11-19 RX ADMIN — Medication SCH CAP: at 08:57

## 2018-11-19 RX ADMIN — Medication SCH MG: at 12:00

## 2018-11-19 RX ADMIN — ACETAMINOPHEN SCH MG: 325 TABLET ORAL at 13:17

## 2018-11-19 RX ADMIN — Medication SCH MG: at 00:00

## 2018-11-19 RX ADMIN — SUMATRIPTAN SUCCINATE PRN MG: 50 TABLET, FILM COATED ORAL at 13:21

## 2018-11-19 RX ADMIN — PREGABALIN SCH MG: 25 CAPSULE ORAL at 13:17

## 2018-11-19 RX ADMIN — ACETAMINOPHEN SCH MG: 325 TABLET ORAL at 17:09

## 2018-11-19 RX ADMIN — Medication SCH CAP: at 13:17

## 2018-11-19 RX ADMIN — PREGABALIN SCH MG: 25 CAPSULE ORAL at 06:00

## 2018-11-19 RX ADMIN — Medication SCH MCG: at 06:00

## 2018-11-19 RX ADMIN — Medication SCH CAP: at 17:09

## 2018-11-19 RX ADMIN — PREGABALIN SCH MG: 25 CAPSULE ORAL at 21:06

## 2018-11-19 RX ADMIN — SUMATRIPTAN SUCCINATE PRN MG: 50 TABLET, FILM COATED ORAL at 18:47

## 2018-11-19 RX ADMIN — ACETAMINOPHEN, ASPIRIN AND CAFFEINE PRN TAB: 250; 250; 65 TABLET, FILM COATED ORAL at 21:07

## 2018-11-19 RX ADMIN — IBUPROFEN PRN MG: 200 TABLET, SUGAR COATED ORAL at 23:23

## 2018-11-19 RX ADMIN — LOPERAMIDE HYDROCHLORIDE PRN MG: 2 CAPSULE ORAL at 10:09

## 2018-11-19 RX ADMIN — LIDOCAINE SCH PATCH: 50 PATCH CUTANEOUS at 08:57

## 2018-11-19 RX ADMIN — PANTOPRAZOLE SODIUM SCH MG: 40 TABLET, DELAYED RELEASE ORAL at 06:00

## 2018-11-19 RX ADMIN — Medication SCH MG: at 06:00

## 2018-11-19 RX ADMIN — Medication SCH TAB: at 08:56

## 2018-11-19 RX ADMIN — IBUPROFEN PRN MG: 200 TABLET, SUGAR COATED ORAL at 10:09

## 2018-11-19 RX ADMIN — LOPERAMIDE HYDROCHLORIDE PRN MG: 2 CAPSULE ORAL at 06:00

## 2018-11-19 RX ADMIN — Medication SCH MG: at 17:13

## 2018-11-19 NOTE — NUR
Received pt sleeping in bed. Aroused easily when called by name. AAO x4. Dr. Lombardo seen pt 
and has new order to increase Excedrin to 2 tablets QID PRN. BP still low. MD aware. No 
acute distress noted. C/o headache, PRN pain med given as ordered. Safety measures 
maintained. Bed alarm on. Call light and personal belongings within reach. Will continue to 
monitor.

## 2018-11-19 NOTE — NUR
Hand-off Report given to Jay YANG. No request for pain med this entire shift. Pt slept very 
well thru-out night without any complaints. No loose stools all shift until 0600. One small 
loose stool on potty. Requested Immodium, given along with regular a.m. meds except 
Lopressor. B/P 98/48 HR 84. Relinquished care of pt at this time.

## 2018-11-19 NOTE — NUR
Nursing EOS note: No significant change during this shift. Pt. A/OX4 with no changes in 
cognition. Able to make her needs known. All due medications given and tolerated well. Pt. 
seen by Dr. Escobar today, with new order to obtain stool sample for c.diff. Pt. with no 
bowel movement during this shift, unable to obtain stool sample. Pt. on RA and tolerating 
well. No new skin condition noted, kept pt. clean, dry and comfortable. All pt. needs 
attended and met promptly. Safety measure and fall precaution in place. Call light and all 
frequently used items in reach. Will endorse to oncoming shift accordingly.

## 2018-11-19 NOTE — NUR
Received pt. in bed with eyes open. Pt. A/OX4 verbally responsive and able to make her needs 
known. Pt. denies CP or SOB. No c/o pain or discomfort at this time, remain comfortable. Pt. 
stated she had x1 loose BM from previous shift. Pt. requesting Imodium today to manage loose 
BM, educated pt. regarding use of medication, pt. verbalized understanding with no other 
concerns. All due AM medications given as ordered and tolerated well. All pt. needs attended 
promptly. Safety and fall precaution in place. Call light and all frequently used items 
within pt. reach. Will continue to monitor accordingly.

## 2018-11-20 VITALS — SYSTOLIC BLOOD PRESSURE: 99 MMHG | DIASTOLIC BLOOD PRESSURE: 53 MMHG

## 2018-11-20 VITALS — DIASTOLIC BLOOD PRESSURE: 64 MMHG | SYSTOLIC BLOOD PRESSURE: 113 MMHG

## 2018-11-20 VITALS — DIASTOLIC BLOOD PRESSURE: 54 MMHG | SYSTOLIC BLOOD PRESSURE: 102 MMHG

## 2018-11-20 VITALS — SYSTOLIC BLOOD PRESSURE: 101 MMHG | DIASTOLIC BLOOD PRESSURE: 48 MMHG

## 2018-11-20 VITALS — SYSTOLIC BLOOD PRESSURE: 100 MMHG | DIASTOLIC BLOOD PRESSURE: 49 MMHG

## 2018-11-20 VITALS — SYSTOLIC BLOOD PRESSURE: 86 MMHG | DIASTOLIC BLOOD PRESSURE: 57 MMHG

## 2018-11-20 VITALS — DIASTOLIC BLOOD PRESSURE: 37 MMHG | SYSTOLIC BLOOD PRESSURE: 99 MMHG

## 2018-11-20 VITALS — SYSTOLIC BLOOD PRESSURE: 96 MMHG | DIASTOLIC BLOOD PRESSURE: 56 MMHG

## 2018-11-20 LAB
ALP SERPL-CCNC: 68 U/L (ref 50–136)
ALT SERPL W/O P-5'-P-CCNC: 18 U/L (ref 14–59)
AST SERPL-CCNC: 16 U/L (ref 15–37)
BASOPHILS # BLD AUTO: 0 K/UL (ref 0–8)
BASOPHILS NFR BLD AUTO: 0.5 % (ref 0–2)
BILIRUB SERPL-MCNC: 0.2 MG/DL (ref 0.2–1)
BUN SERPL-MCNC: 32 MG/DL (ref 7–18)
CHLORIDE SERPL-SCNC: 105 MMOL/L (ref 98–107)
CO2 SERPL-SCNC: 26 MMOL/L (ref 21–32)
CREAT SERPL-MCNC: 1.1 MG/DL (ref 0.6–1.3)
EOSINOPHIL # BLD AUTO: 0.4 K/UL (ref 0–0.7)
EOSINOPHIL NFR BLD AUTO: 4.9 % (ref 0–7)
GLUCOSE SERPL-MCNC: 94 MG/DL (ref 74–106)
HCT VFR BLD AUTO: 35.9 % (ref 31.2–41.9)
HGB BLD-MCNC: 12.3 G/DL (ref 10.9–14.3)
LYMPHOCYTES # BLD AUTO: 1.9 K/UL (ref 20–40)
LYMPHOCYTES NFR BLD AUTO: 21.5 % (ref 20.5–51.5)
MCH RBC QN AUTO: 29.8 UUG (ref 24.7–32.8)
MCHC RBC AUTO-ENTMCNC: 34 G/DL (ref 32.3–35.6)
MCV RBC AUTO: 87.3 FL (ref 75.5–95.3)
MONOCYTES # BLD AUTO: 0.7 K/UL (ref 2–10)
MONOCYTES NFR BLD AUTO: 8 % (ref 0–11)
NEUTROPHILS # BLD AUTO: 5.8 K/UL (ref 1.8–8.9)
NEUTROPHILS NFR BLD AUTO: 65.1 % (ref 38.5–71.5)
PLATELET # BLD AUTO: 407 K/UL (ref 179–408)
POTASSIUM SERPL-SCNC: 4.7 MMOL/L (ref 3.5–5.1)
RBC # BLD AUTO: 4.12 MIL/UL (ref 3.63–4.92)
WBC # BLD AUTO: 8.9 K/UL (ref 3.8–11.8)
WS STN SPEC: 6.7 G/DL (ref 6.4–8.2)

## 2018-11-20 RX ADMIN — ACETAMINOPHEN SCH MG: 325 TABLET ORAL at 09:00

## 2018-11-20 RX ADMIN — FOLIC ACID SCH MG: 1 TABLET ORAL at 08:19

## 2018-11-20 RX ADMIN — Medication SCH CAP: at 17:22

## 2018-11-20 RX ADMIN — ACETAMINOPHEN SCH MG: 325 TABLET ORAL at 17:22

## 2018-11-20 RX ADMIN — Medication SCH CAP: at 13:15

## 2018-11-20 RX ADMIN — PANTOPRAZOLE SODIUM SCH MG: 40 TABLET, DELAYED RELEASE ORAL at 06:07

## 2018-11-20 RX ADMIN — Medication SCH TAB: at 08:19

## 2018-11-20 RX ADMIN — Medication PRN MG: at 22:05

## 2018-11-20 RX ADMIN — Medication SCH MG: at 12:00

## 2018-11-20 RX ADMIN — Medication SCH MG: at 08:19

## 2018-11-20 RX ADMIN — Medication SCH MCG: at 06:07

## 2018-11-20 RX ADMIN — PREGABALIN SCH MG: 25 CAPSULE ORAL at 06:07

## 2018-11-20 RX ADMIN — Medication SCH MG: at 21:00

## 2018-11-20 RX ADMIN — PREGABALIN SCH MG: 25 CAPSULE ORAL at 22:05

## 2018-11-20 RX ADMIN — SUMATRIPTAN SUCCINATE PRN MG: 50 TABLET, FILM COATED ORAL at 11:11

## 2018-11-20 RX ADMIN — LIDOCAINE SCH PATCH: 50 PATCH CUTANEOUS at 08:22

## 2018-11-20 RX ADMIN — ACETAMINOPHEN SCH MG: 325 TABLET ORAL at 13:15

## 2018-11-20 RX ADMIN — Medication SCH MG: at 06:00

## 2018-11-20 RX ADMIN — FOLIC ACID SCH MG: 1 TABLET ORAL at 17:22

## 2018-11-20 RX ADMIN — SUMATRIPTAN SUCCINATE PRN MG: 50 TABLET, FILM COATED ORAL at 14:13

## 2018-11-20 RX ADMIN — PREGABALIN SCH MG: 25 CAPSULE ORAL at 14:05

## 2018-11-20 RX ADMIN — Medication SCH MG: at 18:00

## 2018-11-20 RX ADMIN — SUMATRIPTAN SUCCINATE PRN MG: 50 TABLET, FILM COATED ORAL at 03:05

## 2018-11-20 RX ADMIN — ACETAMINOPHEN, ASPIRIN AND CAFFEINE PRN TAB: 250; 250; 65 TABLET, FILM COATED ORAL at 08:20

## 2018-11-20 RX ADMIN — IBUPROFEN PRN MG: 200 TABLET, SUGAR COATED ORAL at 13:15

## 2018-11-20 RX ADMIN — Medication SCH CAP: at 08:19

## 2018-11-20 RX ADMIN — IBUPROFEN PRN MG: 200 TABLET, SUGAR COATED ORAL at 17:32

## 2018-11-20 NOTE — NUR
Pt seen by MD, new orders received. Cardiology consult provided. Pt continues to report no 
headache relief despite medication, fluid, and environmental implementations. MD made aware. 
BP medication continue to be held due to decreased readings. Call light placed within reach. 
Will continue to monitor and endorse changes to on coming nurse.

## 2018-11-20 NOTE — NUR
SBAR report and Pt received resting in bed this morning, AAOx4. Pt assessed, no SOB noted, 
but reports pain due to unrelieved migraine headache, despite medication intervention. Pt 
complaint with routine medication administration. Pt returned from CT brain scan, MD made 
aware of results, discharge is to remain on hold until tomorrow possibly. BP medications 
held due to continued fluctuation in blood pressure. Bed in locked and lowest position with 
sided rails up, and bed alarm on. All comfort and safety measures implemented. Call light 
and personal items placed within reach. Will continue to monitor.

## 2018-11-21 VITALS — DIASTOLIC BLOOD PRESSURE: 66 MMHG | SYSTOLIC BLOOD PRESSURE: 120 MMHG

## 2018-11-21 VITALS — DIASTOLIC BLOOD PRESSURE: 57 MMHG | SYSTOLIC BLOOD PRESSURE: 150 MMHG

## 2018-11-21 LAB
ALP SERPL-CCNC: 66 U/L (ref 50–136)
ALT SERPL W/O P-5'-P-CCNC: 16 U/L (ref 14–59)
AST SERPL-CCNC: 16 U/L (ref 15–37)
BILIRUB SERPL-MCNC: 0.2 MG/DL (ref 0.2–1)
BUN SERPL-MCNC: 23 MG/DL (ref 7–18)
CHLORIDE SERPL-SCNC: 107 MMOL/L (ref 98–107)
CO2 SERPL-SCNC: 20 MMOL/L (ref 21–32)
CREAT SERPL-MCNC: 0.9 MG/DL (ref 0.6–1.3)
GLUCOSE SERPL-MCNC: 107 MG/DL (ref 74–106)
POTASSIUM SERPL-SCNC: 4.3 MMOL/L (ref 3.5–5.1)
WS STN SPEC: 6.2 G/DL (ref 6.4–8.2)

## 2018-11-21 RX ADMIN — ACETAMINOPHEN SCH MG: 325 TABLET ORAL at 09:26

## 2018-11-21 RX ADMIN — Medication SCH MCG: at 06:07

## 2018-11-21 RX ADMIN — ACETAMINOPHEN AND CODEINE PHOSPHATE PRN TAB: 30; 300 TABLET ORAL at 01:21

## 2018-11-21 RX ADMIN — Medication SCH MG: at 09:26

## 2018-11-21 RX ADMIN — Medication SCH CAP: at 09:27

## 2018-11-21 RX ADMIN — ACETAMINOPHEN AND CODEINE PHOSPHATE PRN TAB: 30; 300 TABLET ORAL at 09:35

## 2018-11-21 RX ADMIN — FOLIC ACID SCH MG: 1 TABLET ORAL at 09:26

## 2018-11-21 RX ADMIN — Medication SCH TAB: at 09:27

## 2018-11-21 RX ADMIN — PANTOPRAZOLE SODIUM SCH MG: 40 TABLET, DELAYED RELEASE ORAL at 06:06

## 2018-11-21 RX ADMIN — LIDOCAINE SCH PATCH: 50 PATCH CUTANEOUS at 09:00

## 2018-11-21 RX ADMIN — ACETAMINOPHEN AND CODEINE PHOSPHATE PRN TAB: 30; 300 TABLET ORAL at 06:07

## 2018-11-21 RX ADMIN — PREGABALIN SCH MG: 25 CAPSULE ORAL at 06:06

## 2018-11-21 NOTE — NUR
Pt seen by MD. New discharge orders received. Plan of care discussed with Pt and . 
discharge paperwork completed, Pt education material reviewed, signed, and copies placed in 
chart, originals provided to Pt. VSS /70, no acute distress, denies pain and reports 
headache resolved to a tolerable level at this time. Pt states "I finally get to go home 
today". Follow up appointment scheduled for 12/4/18 at 1pm discussed, along with plan to 
attend outpatient PT 2-3 days/week for 6 weeks. No home medications to return. Rx faxed to 
pharmacy. Pt seen by Cardiologist and approved for d/c. Pt assessed, skin intact, rash 
resolved. Pt assisted to get dressed and in wheelchair. Belongings accounted for and list 
signed. Both 22 dandy IVs removed from left arm, intact. All discharge concerns addressed. ID 
band removed. Pt safely wheeled out to private car with , Jorden, driving. Will 
remove Pt from system shortly.

## 2018-11-21 NOTE — NUR
pt a/o4; [place]; on room air; no s/s of resp distress; iv to left a.c gauge 22 and left forearm 
guage 22 started for IV hydration; NS at 75cc/hr infusion to left forearm; otherwise no 
significant changes from previous shift.

## 2018-11-21 NOTE — NUR
SBAR report received this morning. Pt resting in bed. Pt assessed AAOx4. No s/s of acute 
distress or SOB noted. Bed in locked and lowest position with side rails up and alarm on. 
Board updated. Call light within reach. All safety and comfort measures in place, will 
continue to monitor.

## 2019-09-04 ENCOUNTER — HOSPITAL ENCOUNTER (INPATIENT)
Dept: HOSPITAL 12 - ER | Age: 84
LOS: 3 days | Discharge: HOME HEALTH | DRG: 193 | End: 2019-09-07
Payer: MEDICARE

## 2019-09-04 VITALS — HEIGHT: 63 IN | WEIGHT: 143 LBS | BODY MASS INDEX: 25.34 KG/M2

## 2019-09-04 VITALS — SYSTOLIC BLOOD PRESSURE: 112 MMHG | DIASTOLIC BLOOD PRESSURE: 64 MMHG

## 2019-09-04 VITALS — SYSTOLIC BLOOD PRESSURE: 104 MMHG | DIASTOLIC BLOOD PRESSURE: 45 MMHG

## 2019-09-04 DIAGNOSIS — G89.4: ICD-10-CM

## 2019-09-04 DIAGNOSIS — Z80.7: ICD-10-CM

## 2019-09-04 DIAGNOSIS — D64.9: ICD-10-CM

## 2019-09-04 DIAGNOSIS — K58.9: ICD-10-CM

## 2019-09-04 DIAGNOSIS — Z88.1: ICD-10-CM

## 2019-09-04 DIAGNOSIS — C79.9: ICD-10-CM

## 2019-09-04 DIAGNOSIS — Z79.811: ICD-10-CM

## 2019-09-04 DIAGNOSIS — I35.8: ICD-10-CM

## 2019-09-04 DIAGNOSIS — Z98.41: ICD-10-CM

## 2019-09-04 DIAGNOSIS — Z88.2: ICD-10-CM

## 2019-09-04 DIAGNOSIS — I50.31: ICD-10-CM

## 2019-09-04 DIAGNOSIS — C50.912: ICD-10-CM

## 2019-09-04 DIAGNOSIS — K21.9: ICD-10-CM

## 2019-09-04 DIAGNOSIS — J18.1: Primary | ICD-10-CM

## 2019-09-04 DIAGNOSIS — D89.9: ICD-10-CM

## 2019-09-04 DIAGNOSIS — Z90.11: ICD-10-CM

## 2019-09-04 DIAGNOSIS — H91.90: ICD-10-CM

## 2019-09-04 DIAGNOSIS — J99: ICD-10-CM

## 2019-09-04 DIAGNOSIS — M41.9: ICD-10-CM

## 2019-09-04 DIAGNOSIS — Z90.710: ICD-10-CM

## 2019-09-04 DIAGNOSIS — J98.11: ICD-10-CM

## 2019-09-04 DIAGNOSIS — Z87.891: ICD-10-CM

## 2019-09-04 DIAGNOSIS — Z83.3: ICD-10-CM

## 2019-09-04 DIAGNOSIS — Z91.018: ICD-10-CM

## 2019-09-04 DIAGNOSIS — Z96.652: ICD-10-CM

## 2019-09-04 DIAGNOSIS — Z80.1: ICD-10-CM

## 2019-09-04 DIAGNOSIS — E03.9: ICD-10-CM

## 2019-09-04 DIAGNOSIS — Z98.42: ICD-10-CM

## 2019-09-04 DIAGNOSIS — I11.0: ICD-10-CM

## 2019-09-04 DIAGNOSIS — Z85.3: ICD-10-CM

## 2019-09-04 DIAGNOSIS — Z80.8: ICD-10-CM

## 2019-09-04 DIAGNOSIS — Z82.0: ICD-10-CM

## 2019-09-04 LAB
ALP SERPL-CCNC: 171 U/L (ref 50–136)
ALT SERPL W/O P-5'-P-CCNC: 40 U/L (ref 14–59)
AST SERPL-CCNC: 40 U/L (ref 15–37)
BASOPHILS # BLD AUTO: 0.1 K/UL (ref 0–8)
BASOPHILS NFR BLD AUTO: 0.6 % (ref 0–2)
BILIRUB DIRECT SERPL-MCNC: 0.1 MG/DL (ref 0–0.2)
BILIRUB SERPL-MCNC: 0.3 MG/DL (ref 0.2–1)
BUN SERPL-MCNC: 26 MG/DL (ref 7–18)
CHLORIDE SERPL-SCNC: 104 MMOL/L (ref 98–107)
CO2 SERPL-SCNC: 26 MMOL/L (ref 21–32)
CREAT SERPL-MCNC: 0.8 MG/DL (ref 0.6–1.3)
EOSINOPHIL # BLD AUTO: 0.1 K/UL (ref 0–0.7)
EOSINOPHIL NFR BLD AUTO: 0.6 % (ref 0–7)
GLUCOSE SERPL-MCNC: 120 MG/DL (ref 74–106)
HCT VFR BLD AUTO: 30.6 % (ref 31.2–41.9)
HGB BLD-MCNC: 10.1 G/DL (ref 10.9–14.3)
LYMPHOCYTES # BLD AUTO: 1.3 K/UL (ref 20–40)
LYMPHOCYTES NFR BLD AUTO: 9.8 % (ref 20.5–51.5)
MCH RBC QN AUTO: 28.1 UUG (ref 24.7–32.8)
MCHC RBC AUTO-ENTMCNC: 33 G/DL (ref 32.3–35.6)
MCV RBC AUTO: 85.1 FL (ref 75.5–95.3)
MONOCYTES # BLD AUTO: 1 K/UL (ref 2–10)
MONOCYTES NFR BLD AUTO: 8 % (ref 0–11)
NEUTROPHILS # BLD AUTO: 10.3 K/UL (ref 1.8–8.9)
NEUTROPHILS NFR BLD AUTO: 81 % (ref 38.5–71.5)
PLATELET # BLD AUTO: 538 K/UL (ref 179–408)
POTASSIUM SERPL-SCNC: 4 MMOL/L (ref 3.5–5.1)
RBC # BLD AUTO: 3.59 MIL/UL (ref 3.63–4.92)
WBC # BLD AUTO: 12.7 K/UL (ref 3.8–11.8)
WS STN SPEC: 7.3 G/DL (ref 6.4–8.2)

## 2019-09-04 PROCEDURE — G0378 HOSPITAL OBSERVATION PER HR: HCPCS

## 2019-09-04 PROCEDURE — A4663 DIALYSIS BLOOD PRESSURE CUFF: HCPCS

## 2019-09-04 RX ADMIN — Medication SCH MG: at 20:47

## 2019-09-04 RX ADMIN — LOSARTAN POTASSIUM SCH MG: 50 TABLET, FILM COATED ORAL at 20:54

## 2019-09-04 RX ADMIN — Medication PRN MG: at 20:47

## 2019-09-04 RX ADMIN — Medication PRN MG: at 21:55

## 2019-09-04 NOTE — NUR
received patient from ER.  Patient oriented to room and and IV medications continued from 
ER. Complete assessment done and asked spouse at bedside to bring in DNR paperwork from 
home.  Patients  to bring home meds that are not formulary in.

## 2019-09-04 NOTE — NUR
IV Levaquin & IV Vancomycin are still infusing enroute to floor, endorsed to RN 
Vicente EVERETT.  IV Zosyn is still to be given.  This bag of Zosyn was handed to floor 
nurse Maris/ Vicetne.

## 2019-09-04 NOTE — NUR
Received patient in bed AAOx4. On O2 at 2lpm via NC, saturating at 97%. No complaints of 
pain at this time. SR on Tele at 84bpm.   IV site on L FA 22g intact and patent, w/ IVF 
infusing. Safety measures observed. Call light in reach

## 2019-09-05 VITALS — DIASTOLIC BLOOD PRESSURE: 60 MMHG | SYSTOLIC BLOOD PRESSURE: 110 MMHG

## 2019-09-05 VITALS — SYSTOLIC BLOOD PRESSURE: 120 MMHG | DIASTOLIC BLOOD PRESSURE: 60 MMHG

## 2019-09-05 VITALS — DIASTOLIC BLOOD PRESSURE: 54 MMHG | SYSTOLIC BLOOD PRESSURE: 99 MMHG

## 2019-09-05 VITALS — SYSTOLIC BLOOD PRESSURE: 107 MMHG | DIASTOLIC BLOOD PRESSURE: 47 MMHG

## 2019-09-05 VITALS — DIASTOLIC BLOOD PRESSURE: 46 MMHG | SYSTOLIC BLOOD PRESSURE: 104 MMHG

## 2019-09-05 RX ADMIN — PREGABALIN SCH MG: 25 CAPSULE ORAL at 17:52

## 2019-09-05 RX ADMIN — LOSARTAN POTASSIUM SCH MG: 50 TABLET, FILM COATED ORAL at 09:39

## 2019-09-05 RX ADMIN — FOLIC ACID SCH MG: 1 TABLET ORAL at 08:26

## 2019-09-05 RX ADMIN — SODIUM CHLORIDE PRN MLS/HR: 0.9 INJECTION, SOLUTION INTRAVENOUS at 03:25

## 2019-09-05 RX ADMIN — FOLIC ACID SCH MG: 1 TABLET ORAL at 17:52

## 2019-09-05 RX ADMIN — Medication SCH MG: at 08:26

## 2019-09-05 RX ADMIN — PANTOPRAZOLE SODIUM SCH MG: 40 TABLET, DELAYED RELEASE ORAL at 08:26

## 2019-09-05 RX ADMIN — Medication PRN MG: at 21:09

## 2019-09-05 RX ADMIN — Medication SCH MG: at 17:53

## 2019-09-05 RX ADMIN — PREGABALIN SCH MG: 25 CAPSULE ORAL at 12:31

## 2019-09-05 RX ADMIN — ACETAMINOPHEN PRN MG: 325 TABLET ORAL at 20:07

## 2019-09-05 RX ADMIN — Medication SCH TAB: at 08:26

## 2019-09-05 RX ADMIN — ACETAMINOPHEN PRN MG: 325 TABLET ORAL at 13:35

## 2019-09-05 RX ADMIN — Medication PRN MG: at 22:18

## 2019-09-05 RX ADMIN — Medication SCH MCG: at 06:43

## 2019-09-05 RX ADMIN — Medication SCH MG: at 20:37

## 2019-09-05 RX ADMIN — LOSARTAN POTASSIUM SCH MG: 50 TABLET, FILM COATED ORAL at 20:07

## 2019-09-05 RX ADMIN — ANASTROZOLE SCH MG: 1 TABLET ORAL at 09:41

## 2019-09-05 NOTE — NUR
Patient received awake, AAOx4, no acute distress at this time. on tele SR. denies SOB at 
this time. comfort measures provided. call light within reach. will continue to monitor 
closely.

## 2019-09-05 NOTE — NUR
Patient slept well throughout the night. No SOB noted, not in distress. SR and ST on Tele at 
95bpm. No complaints of pain at this time. All needs attended. Will endorse accordingly

## 2019-09-05 NOTE — NUR
Received patient alert and awake in bed. A/Ox4. No signs of acute distress noted. Complains 
of back pain, requesting Tylenol. No complaints of SOB. IVF running on the left forearm. No 
s/s of infection or infiltration noted. Safety measures initiated. Bed is low and locked, 
call light within reach. Will continue to monitor.

## 2019-09-06 VITALS — DIASTOLIC BLOOD PRESSURE: 57 MMHG | SYSTOLIC BLOOD PRESSURE: 119 MMHG

## 2019-09-06 VITALS — DIASTOLIC BLOOD PRESSURE: 76 MMHG | SYSTOLIC BLOOD PRESSURE: 133 MMHG

## 2019-09-06 VITALS — SYSTOLIC BLOOD PRESSURE: 115 MMHG | DIASTOLIC BLOOD PRESSURE: 57 MMHG

## 2019-09-06 VITALS — DIASTOLIC BLOOD PRESSURE: 79 MMHG | SYSTOLIC BLOOD PRESSURE: 155 MMHG

## 2019-09-06 VITALS — SYSTOLIC BLOOD PRESSURE: 105 MMHG | DIASTOLIC BLOOD PRESSURE: 51 MMHG

## 2019-09-06 VITALS — SYSTOLIC BLOOD PRESSURE: 124 MMHG | DIASTOLIC BLOOD PRESSURE: 62 MMHG

## 2019-09-06 LAB
BASOPHILS # BLD AUTO: 0.1 K/UL (ref 0–8)
BASOPHILS NFR BLD AUTO: 0.7 % (ref 0–2)
BUN SERPL-MCNC: 16 MG/DL (ref 7–18)
CHLORIDE SERPL-SCNC: 105 MMOL/L (ref 98–107)
CO2 SERPL-SCNC: 26 MMOL/L (ref 21–32)
CREAT SERPL-MCNC: 0.7 MG/DL (ref 0.6–1.3)
EOSINOPHIL # BLD AUTO: 0.1 K/UL (ref 0–0.7)
EOSINOPHIL NFR BLD AUTO: 1.3 % (ref 0–7)
EOSINOPHIL NFR BLD MANUAL: 1 % (ref 0–8)
GLUCOSE SERPL-MCNC: 118 MG/DL (ref 74–106)
HCT VFR BLD AUTO: 30.7 % (ref 31.2–41.9)
HGB BLD-MCNC: 10 G/DL (ref 10.9–14.3)
IRON SERPL-MCNC: 23 UG/DL (ref 50–175)
LYMPHOCYTES # BLD AUTO: 1.4 K/UL (ref 20–40)
LYMPHOCYTES NFR BLD AUTO: 13.7 % (ref 20.5–51.5)
LYMPHOCYTES NFR BLD MANUAL: 18 % (ref 20–40)
MAGNESIUM SERPL-MCNC: 1.7 MG/DL (ref 1.8–2.4)
MCH RBC QN AUTO: 28.4 UUG (ref 24.7–32.8)
MCHC RBC AUTO-ENTMCNC: 33 G/DL (ref 32.3–35.6)
MCV RBC AUTO: 87 FL (ref 75.5–95.3)
METAMYELOCYTES NFR BLD MANUAL: 2 % (ref 0–1)
MONOCYTES # BLD AUTO: 0.9 K/UL (ref 2–10)
MONOCYTES NFR BLD AUTO: 8.3 % (ref 0–11)
MONOCYTES NFR BLD MANUAL: 11 % (ref 2–10)
MYELOCYTES NFR BLD MANUAL: 1 % (ref 0–0)
NEUTROPHILS # BLD AUTO: 7.9 K/UL (ref 1.8–8.9)
NEUTROPHILS NFR BLD AUTO: 76 % (ref 38.5–71.5)
NEUTS BAND NFR BLD MANUAL: 2 % (ref 0–10)
NEUTS SEG NFR BLD MANUAL: 65 % (ref 42–75)
PHOSPHATE SERPL-MCNC: 3.9 MG/DL (ref 2.5–4.9)
PLATELET # BLD AUTO: 563 K/UL (ref 179–408)
POTASSIUM SERPL-SCNC: 3.6 MMOL/L (ref 3.5–5.1)
RBC # BLD AUTO: 3.53 MIL/UL (ref 3.63–4.92)
WBC # BLD AUTO: 10.4 K/UL (ref 3.8–11.8)

## 2019-09-06 PROCEDURE — 05HC33Z INSERTION OF INFUSION DEVICE INTO LEFT BASILIC VEIN, PERCUTANEOUS APPROACH: ICD-10-PCS

## 2019-09-06 RX ADMIN — PANTOPRAZOLE SODIUM SCH MG: 40 TABLET, DELAYED RELEASE ORAL at 08:36

## 2019-09-06 RX ADMIN — PREGABALIN SCH MG: 25 CAPSULE ORAL at 17:09

## 2019-09-06 RX ADMIN — Medication SCH MCG: at 06:30

## 2019-09-06 RX ADMIN — Medication SCH TAB: at 08:35

## 2019-09-06 RX ADMIN — FOLIC ACID SCH MG: 1 TABLET ORAL at 17:09

## 2019-09-06 RX ADMIN — ACETAMINOPHEN PRN MG: 325 TABLET ORAL at 09:36

## 2019-09-06 RX ADMIN — LOSARTAN POTASSIUM SCH MG: 50 TABLET, FILM COATED ORAL at 08:36

## 2019-09-06 RX ADMIN — Medication SCH MG: at 08:35

## 2019-09-06 RX ADMIN — SODIUM CHLORIDE PRN MLS/HR: 0.9 INJECTION, SOLUTION INTRAVENOUS at 00:47

## 2019-09-06 RX ADMIN — MAGNESIUM SULFATE IN DEXTROSE SCH MLS/HR: 10 INJECTION, SOLUTION INTRAVENOUS at 10:53

## 2019-09-06 RX ADMIN — PREGABALIN SCH MG: 25 CAPSULE ORAL at 06:30

## 2019-09-06 RX ADMIN — Medication SCH MG: at 20:22

## 2019-09-06 RX ADMIN — ANASTROZOLE SCH MG: 1 TABLET ORAL at 08:35

## 2019-09-06 RX ADMIN — Medication SCH MG: at 17:09

## 2019-09-06 RX ADMIN — PREGABALIN SCH MG: 25 CAPSULE ORAL at 11:52

## 2019-09-06 RX ADMIN — MAGNESIUM SULFATE IN DEXTROSE SCH MLS/HR: 10 INJECTION, SOLUTION INTRAVENOUS at 09:36

## 2019-09-06 RX ADMIN — LOSARTAN POTASSIUM SCH MG: 50 TABLET, FILM COATED ORAL at 20:23

## 2019-09-06 RX ADMIN — FOLIC ACID SCH MG: 1 TABLET ORAL at 08:35

## 2019-09-06 RX ADMIN — ACETAMINOPHEN PRN MG: 325 TABLET ORAL at 20:22

## 2019-09-06 RX ADMIN — Medication SCH MG: at 08:36

## 2019-09-06 NOTE — NUR
Patient slept well throughout the shift. Caren repeated x1 as ordered as 1 dose was not 
effective. No signs of acute distress noted. No complaints of SOB. Patient is SR on TELE 
monitor. IVF running on the left forearm, no s/s of infiltration or infection. Safety 
measures given.

## 2019-09-06 NOTE — NUR
PATIENT SLEPT INTERMITTENTLY THROUGHOUT DAY. PATIENT REPORTS FEELING SOB WHEN SHE SPEAKS OR 
EATS. OTHERWISE PATIENT DOES NOT FEEL SOB WHILE RESTING. PATIENT WALKED WITH PHYSICAL 
THERAPY WITHOUT ANY COMPLICATIONS. PATIENT REMOVED O2 VIA NC, NO SOB PATIENT SATING AT 95-95 
RA.

## 2019-09-06 NOTE — NUR
RECEIVED PATIENT SLEEPING IN BED COMFORTABLY. NO ACUTE DISTRESS NOTED. BED IN LOWEST 
POSITION, SIDE RAILS UP X2, CALL LIGHT WITHIN REACH. WILL CONTINUE TO MONITOR.

## 2019-09-07 VITALS — SYSTOLIC BLOOD PRESSURE: 104 MMHG | DIASTOLIC BLOOD PRESSURE: 54 MMHG

## 2019-09-07 VITALS — SYSTOLIC BLOOD PRESSURE: 123 MMHG | DIASTOLIC BLOOD PRESSURE: 58 MMHG

## 2019-09-07 VITALS — DIASTOLIC BLOOD PRESSURE: 70 MMHG | SYSTOLIC BLOOD PRESSURE: 132 MMHG

## 2019-09-07 VITALS — DIASTOLIC BLOOD PRESSURE: 60 MMHG | SYSTOLIC BLOOD PRESSURE: 120 MMHG

## 2019-09-07 VITALS — DIASTOLIC BLOOD PRESSURE: 70 MMHG | SYSTOLIC BLOOD PRESSURE: 140 MMHG

## 2019-09-07 RX ADMIN — Medication SCH MCG: at 06:33

## 2019-09-07 RX ADMIN — Medication SCH TAB: at 08:59

## 2019-09-07 RX ADMIN — ANASTROZOLE SCH MG: 1 TABLET ORAL at 09:01

## 2019-09-07 RX ADMIN — ACETAMINOPHEN PRN MG: 325 TABLET ORAL at 17:27

## 2019-09-07 RX ADMIN — PREGABALIN SCH MG: 25 CAPSULE ORAL at 06:23

## 2019-09-07 RX ADMIN — Medication SCH MG: at 09:00

## 2019-09-07 RX ADMIN — FOLIC ACID SCH MG: 1 TABLET ORAL at 08:59

## 2019-09-07 RX ADMIN — ACETAMINOPHEN PRN MG: 325 TABLET ORAL at 07:41

## 2019-09-07 RX ADMIN — PREGABALIN SCH MG: 25 CAPSULE ORAL at 12:05

## 2019-09-07 RX ADMIN — PANTOPRAZOLE SODIUM SCH MG: 40 TABLET, DELAYED RELEASE ORAL at 09:00

## 2019-09-07 RX ADMIN — Medication SCH MG: at 17:27

## 2019-09-07 RX ADMIN — LOSARTAN POTASSIUM SCH MG: 50 TABLET, FILM COATED ORAL at 08:59

## 2019-09-07 RX ADMIN — FOLIC ACID SCH MG: 1 TABLET ORAL at 17:28

## 2019-09-07 RX ADMIN — PREGABALIN SCH MG: 25 CAPSULE ORAL at 17:26

## 2019-10-01 ENCOUNTER — HOSPITAL ENCOUNTER (OUTPATIENT)
Dept: HOSPITAL 12 - RAD | Age: 84
Discharge: HOME | End: 2019-10-01
Payer: MEDICARE

## 2019-10-01 DIAGNOSIS — M85.88: ICD-10-CM

## 2019-10-01 DIAGNOSIS — M47.814: ICD-10-CM

## 2019-10-01 DIAGNOSIS — J98.6: Primary | ICD-10-CM

## 2019-10-01 DIAGNOSIS — M40.294: ICD-10-CM

## 2019-10-01 DIAGNOSIS — M41.84: ICD-10-CM

## 2019-11-13 NOTE — NUR
L/m to return call to go over pre-op questions.   MD is at bedside doing the MSE (medical screening exam).

## 2020-02-07 ENCOUNTER — HOSPITAL ENCOUNTER (OUTPATIENT)
Dept: HOSPITAL 54 - CARD | Age: 85
Discharge: HOME | End: 2020-02-07
Attending: INTERNAL MEDICINE
Payer: MEDICARE

## 2020-02-07 DIAGNOSIS — R60.0: ICD-10-CM

## 2020-02-07 DIAGNOSIS — I82.403: Primary | ICD-10-CM

## 2020-05-17 ENCOUNTER — HOSPITAL ENCOUNTER (INPATIENT)
Dept: HOSPITAL 12 - ER | Age: 85
LOS: 3 days | Discharge: HOME HEALTH | DRG: 189 | End: 2020-05-20
Payer: MEDICARE

## 2020-05-17 VITALS — HEIGHT: 63 IN | BODY MASS INDEX: 25.69 KG/M2 | WEIGHT: 145 LBS

## 2020-05-17 VITALS — SYSTOLIC BLOOD PRESSURE: 123 MMHG | DIASTOLIC BLOOD PRESSURE: 58 MMHG

## 2020-05-17 DIAGNOSIS — J98.4: ICD-10-CM

## 2020-05-17 DIAGNOSIS — Z85.3: ICD-10-CM

## 2020-05-17 DIAGNOSIS — E55.9: ICD-10-CM

## 2020-05-17 DIAGNOSIS — J98.11: ICD-10-CM

## 2020-05-17 DIAGNOSIS — Z90.710: ICD-10-CM

## 2020-05-17 DIAGNOSIS — Z83.3: ICD-10-CM

## 2020-05-17 DIAGNOSIS — K58.9: ICD-10-CM

## 2020-05-17 DIAGNOSIS — E03.9: ICD-10-CM

## 2020-05-17 DIAGNOSIS — M41.9: ICD-10-CM

## 2020-05-17 DIAGNOSIS — Z90.11: ICD-10-CM

## 2020-05-17 DIAGNOSIS — K21.9: ICD-10-CM

## 2020-05-17 DIAGNOSIS — G89.4: ICD-10-CM

## 2020-05-17 DIAGNOSIS — I10: ICD-10-CM

## 2020-05-17 DIAGNOSIS — H91.90: ICD-10-CM

## 2020-05-17 DIAGNOSIS — J96.01: Primary | ICD-10-CM

## 2020-05-17 DIAGNOSIS — I87.2: ICD-10-CM

## 2020-05-17 DIAGNOSIS — Z79.811: ICD-10-CM

## 2020-05-17 DIAGNOSIS — J90: ICD-10-CM

## 2020-05-17 DIAGNOSIS — N17.9: ICD-10-CM

## 2020-05-17 DIAGNOSIS — R07.89: ICD-10-CM

## 2020-05-17 DIAGNOSIS — Z88.2: ICD-10-CM

## 2020-05-17 DIAGNOSIS — Z80.8: ICD-10-CM

## 2020-05-17 LAB
ALP SERPL-CCNC: 100 U/L (ref 50–136)
ALT SERPL W/O P-5'-P-CCNC: 10 U/L (ref 14–59)
AST SERPL-CCNC: 12 U/L (ref 15–37)
BASE EXCESS BLDA CALC-SCNC: -2.7 MMOL/L
BASOPHILS # BLD AUTO: 0.1 K/UL (ref 0–8)
BASOPHILS NFR BLD AUTO: 1.3 % (ref 0–2)
BILIRUB DIRECT SERPL-MCNC: 0.1 MG/DL (ref 0–0.2)
BILIRUB SERPL-MCNC: 0.4 MG/DL (ref 0.2–1)
BUN SERPL-MCNC: 31 MG/DL (ref 7–18)
CHLORIDE SERPL-SCNC: 99 MMOL/L (ref 98–107)
CK SERPL-CCNC: 29 U/L (ref 26–192)
CO2 SERPL-SCNC: 25 MMOL/L (ref 21–32)
CREAT SERPL-MCNC: 1.3 MG/DL (ref 0.6–1.3)
EOSINOPHIL # BLD AUTO: 0 K/UL (ref 0–0.7)
EOSINOPHIL NFR BLD AUTO: 0 % (ref 0–7)
ETHANOL SERPL-MCNC: < 3 MG/DL (ref 0–0)
FERRITIN SERPL-MCNC: 140 NG/ML (ref 8–252)
GLUCOSE SERPL-MCNC: 161 MG/DL (ref 74–106)
HCO3 BLDA-SCNC: 23 MMOL/L
HCT VFR BLD AUTO: 38.7 % (ref 31.2–41.9)
HGB BLD-MCNC: 12.9 G/DL (ref 10.9–14.3)
HGB BLDA OXIMETRY-MCNC: 14.7 G/DL (ref 12–16)
INHALED O2 CONCENTRATION: 28 %
LDH SERPL L TO P-CCNC: 94 U/L (ref 81–234)
LYMPHOCYTES # BLD AUTO: 0.7 K/UL (ref 20–40)
LYMPHOCYTES NFR BLD AUTO: 10 % (ref 20.5–51.5)
LYMPHOCYTES NFR BLD MANUAL: 9 % (ref 20–40)
MCH RBC QN AUTO: 31.1 UUG (ref 24.7–32.8)
MCHC RBC AUTO-ENTMCNC: 33 G/DL (ref 32.3–35.6)
MCV RBC AUTO: 93.6 FL (ref 75.5–95.3)
MONOCYTES # BLD AUTO: 0.3 K/UL (ref 2–10)
MONOCYTES NFR BLD AUTO: 5 % (ref 0–11)
MONOCYTES NFR BLD MANUAL: 5 % (ref 2–10)
NEUTROPHILS # BLD AUTO: 5.7 K/UL (ref 1.8–8.9)
NEUTROPHILS NFR BLD AUTO: 83.7 % (ref 38.5–71.5)
NEUTS BAND NFR BLD MANUAL: 10 % (ref 0–10)
NEUTS SEG NFR BLD MANUAL: 76 % (ref 42–75)
PCO2 TEMP ADJ BLDA: 43.5 MMHG (ref 35–45)
PH TEMP ADJ BLDA: 7.34 [PH] (ref 7.35–7.45)
PLATELET # BLD AUTO: 365 K/UL (ref 179–408)
PO2 TEMP ADJ BLDA: 86.6 MMHG (ref 75–100)
POTASSIUM SERPL-SCNC: 4.2 MMOL/L (ref 3.5–5.1)
RBC # BLD AUTO: 4.13 MIL/UL (ref 3.63–4.92)
SPECIMEN DRAWN FROM PATIENT: (no result)
WBC # BLD AUTO: 6.8 K/UL (ref 3.8–11.8)
WS STN SPEC: 7.9 G/DL (ref 6.4–8.2)

## 2020-05-17 PROCEDURE — G0480 DRUG TEST DEF 1-7 CLASSES: HCPCS

## 2020-05-17 PROCEDURE — A4663 DIALYSIS BLOOD PRESSURE CUFF: HCPCS

## 2020-05-17 PROCEDURE — G0378 HOSPITAL OBSERVATION PER HR: HCPCS

## 2020-05-17 RX ADMIN — Medication PRN MG: at 21:06

## 2020-05-17 NOTE — NUR
Received patient awake and alert. Patient shows no signs or symptoms of distress at this 
time. Vital signs stable: BP- 123/58, P-95, R-18, T-97.8, 93% O2 on 2L NC. Patient complains 
of left flank pain 8/10. Will administer PRN pain medication when available. Bed set to 
lowest position. Call light within reach. Will continue to monitor patient.

## 2020-05-18 VITALS — DIASTOLIC BLOOD PRESSURE: 71 MMHG | SYSTOLIC BLOOD PRESSURE: 126 MMHG

## 2020-05-18 VITALS — SYSTOLIC BLOOD PRESSURE: 109 MMHG | DIASTOLIC BLOOD PRESSURE: 70 MMHG

## 2020-05-18 VITALS — SYSTOLIC BLOOD PRESSURE: 126 MMHG | DIASTOLIC BLOOD PRESSURE: 67 MMHG

## 2020-05-18 VITALS — SYSTOLIC BLOOD PRESSURE: 139 MMHG | DIASTOLIC BLOOD PRESSURE: 76 MMHG

## 2020-05-18 VITALS — SYSTOLIC BLOOD PRESSURE: 127 MMHG | DIASTOLIC BLOOD PRESSURE: 74 MMHG

## 2020-05-18 LAB
BASOPHILS # BLD AUTO: 0.1 K/UL (ref 0–8)
BASOPHILS NFR BLD AUTO: 1.1 % (ref 0–2)
BUN SERPL-MCNC: 34 MG/DL (ref 7–18)
CHLORIDE SERPL-SCNC: 100 MMOL/L (ref 98–107)
CO2 SERPL-SCNC: 24 MMOL/L (ref 21–32)
CREAT SERPL-MCNC: 1.3 MG/DL (ref 0.6–1.3)
EOSINOPHIL # BLD AUTO: 0 K/UL (ref 0–0.7)
EOSINOPHIL NFR BLD AUTO: 0.1 % (ref 0–7)
GLUCOSE SERPL-MCNC: 128 MG/DL (ref 74–106)
HCT VFR BLD AUTO: 36.8 % (ref 31.2–41.9)
HGB BLD-MCNC: 12.3 G/DL (ref 10.9–14.3)
LYMPHOCYTES # BLD AUTO: 0.7 K/UL (ref 20–40)
LYMPHOCYTES NFR BLD AUTO: 14.5 % (ref 20.5–51.5)
LYMPHOCYTES NFR BLD MANUAL: 25 % (ref 20–40)
MCH RBC QN AUTO: 31.2 UUG (ref 24.7–32.8)
MCHC RBC AUTO-ENTMCNC: 33 G/DL (ref 32.3–35.6)
MCV RBC AUTO: 93.6 FL (ref 75.5–95.3)
MONOCYTES # BLD AUTO: 0.4 K/UL (ref 2–10)
MONOCYTES NFR BLD AUTO: 8.3 % (ref 0–11)
MONOCYTES NFR BLD MANUAL: 5 % (ref 2–10)
NEUTROPHILS # BLD AUTO: 3.9 K/UL (ref 1.8–8.9)
NEUTROPHILS NFR BLD AUTO: 76 % (ref 38.5–71.5)
NEUTS BAND NFR BLD MANUAL: 3 % (ref 0–10)
NEUTS SEG NFR BLD MANUAL: 67 % (ref 42–75)
PLATELET # BLD AUTO: 282 K/UL (ref 179–408)
POTASSIUM SERPL-SCNC: 3.7 MMOL/L (ref 3.5–5.1)
RBC # BLD AUTO: 3.93 MIL/UL (ref 3.63–4.92)
WBC # BLD AUTO: 5.1 K/UL (ref 3.8–11.8)
WBC NRBC COR # BLD AUTO: 1 /100WBC

## 2020-05-18 RX ADMIN — AMLODIPINE BESYLATE SCH MG: 5 TABLET ORAL at 08:45

## 2020-05-18 RX ADMIN — Medication SCH MG: at 08:36

## 2020-05-18 RX ADMIN — METRONIDAZOLE SCH MLS/HR: 500 SOLUTION INTRAVENOUS at 08:35

## 2020-05-18 RX ADMIN — Medication PRN MG: at 04:17

## 2020-05-18 RX ADMIN — PREGABALIN SCH MG: 25 CAPSULE ORAL at 08:36

## 2020-05-18 RX ADMIN — PREGABALIN SCH MG: 25 CAPSULE ORAL at 17:50

## 2020-05-18 RX ADMIN — Medication SCH MG: at 08:45

## 2020-05-18 RX ADMIN — METRONIDAZOLE SCH MLS/HR: 500 SOLUTION INTRAVENOUS at 17:49

## 2020-05-18 RX ADMIN — FOLIC ACID SCH MG: 1 TABLET ORAL at 08:36

## 2020-05-18 RX ADMIN — PREGABALIN SCH MG: 25 CAPSULE ORAL at 21:16

## 2020-05-18 RX ADMIN — ACETAMINOPHEN PRN MG: 325 TABLET ORAL at 00:50

## 2020-05-18 RX ADMIN — Medication PRN MG: at 15:30

## 2020-05-18 RX ADMIN — ACETAMINOPHEN PRN MG: 325 TABLET ORAL at 15:30

## 2020-05-18 RX ADMIN — Medication SCH MG: at 00:50

## 2020-05-18 RX ADMIN — Medication PRN MG: at 10:24

## 2020-05-18 RX ADMIN — ANASTROZOLE SCH MG: 1 TABLET ORAL at 08:38

## 2020-05-18 RX ADMIN — Medication PRN MG: at 19:52

## 2020-05-18 RX ADMIN — Medication SCH MG: at 21:17

## 2020-05-18 RX ADMIN — LIDOCAINE SCH PATCH: 50 PATCH CUTANEOUS at 17:50

## 2020-05-18 RX ADMIN — Medication SCH MG: at 21:16

## 2020-05-18 NOTE — NUR
Received patient lying in bed. AAOx4. In no acute distress. NSR on tele at 66/min. IV site 
on right AC intact and patent. On O2 at 2LPM via NC in place. O2 sat at 95%. Denies any SOB. 
Complained of pain on left rib cage area. Will provide pain medication per order. Droplet 
and contact precaution observed. Needs attended to and met. Safety measure initiated and 
call bell within reached.

## 2020-05-18 NOTE — NUR
Attempted to collect urine from patient but is unable to urinate at this time. Informed 
patient that we may have to collect urine by straight catheterization. Patient is strongly 
refusing to be catheterized. Explained importance but patient still refuses at this time. 
Will continue to monitor patient.

## 2020-05-18 NOTE — NUR
Patient shows no signs or symptoms of distress at this time. Patient is resting comfortably 
in bed. No fever, cough, or shortness of breath noted throughout the shift. Patient is on 
isolation for R/O Covid. Patient's O2 is 95% on 2L. Bed set to lowest position. Call light 
within reach. Will endorse patient to day shift nurse in stable condition.

## 2020-05-18 NOTE — NUR
DOCTOR KAIT IN THE UNIT TO SEE PATIENT. ALREADY SPOKE TO PULMO FOR CONSULTATION. 
WAITING PULMONARY TO COME SEE PATIENT.

## 2020-05-19 VITALS — SYSTOLIC BLOOD PRESSURE: 145 MMHG | DIASTOLIC BLOOD PRESSURE: 68 MMHG

## 2020-05-19 VITALS — DIASTOLIC BLOOD PRESSURE: 76 MMHG | SYSTOLIC BLOOD PRESSURE: 129 MMHG

## 2020-05-19 VITALS — DIASTOLIC BLOOD PRESSURE: 68 MMHG | SYSTOLIC BLOOD PRESSURE: 125 MMHG

## 2020-05-19 VITALS — SYSTOLIC BLOOD PRESSURE: 115 MMHG | DIASTOLIC BLOOD PRESSURE: 71 MMHG

## 2020-05-19 VITALS — DIASTOLIC BLOOD PRESSURE: 75 MMHG | SYSTOLIC BLOOD PRESSURE: 136 MMHG

## 2020-05-19 VITALS — SYSTOLIC BLOOD PRESSURE: 117 MMHG | DIASTOLIC BLOOD PRESSURE: 56 MMHG

## 2020-05-19 PROCEDURE — 0W9B3ZX DRAINAGE OF LEFT PLEURAL CAVITY, PERCUTANEOUS APPROACH, DIAGNOSTIC: ICD-10-PCS | Performed by: RADIOLOGY

## 2020-05-19 RX ADMIN — PANTOPRAZOLE SODIUM SCH MG: 40 TABLET, DELAYED RELEASE ORAL at 09:08

## 2020-05-19 RX ADMIN — Medication SCH MG: at 20:41

## 2020-05-19 RX ADMIN — METRONIDAZOLE SCH MLS/HR: 500 SOLUTION INTRAVENOUS at 17:35

## 2020-05-19 RX ADMIN — Medication SCH MCG: at 09:08

## 2020-05-19 RX ADMIN — Medication PRN MG: at 13:05

## 2020-05-19 RX ADMIN — FOLIC ACID SCH MG: 1 TABLET ORAL at 09:08

## 2020-05-19 RX ADMIN — PREGABALIN SCH MG: 25 CAPSULE ORAL at 09:06

## 2020-05-19 RX ADMIN — Medication PRN MG: at 05:21

## 2020-05-19 RX ADMIN — METRONIDAZOLE SCH MLS/HR: 500 SOLUTION INTRAVENOUS at 00:19

## 2020-05-19 RX ADMIN — PREGABALIN SCH MG: 25 CAPSULE ORAL at 13:05

## 2020-05-19 RX ADMIN — AMLODIPINE BESYLATE SCH MG: 5 TABLET ORAL at 09:07

## 2020-05-19 RX ADMIN — METRONIDAZOLE SCH MLS/HR: 500 SOLUTION INTRAVENOUS at 09:11

## 2020-05-19 RX ADMIN — ANASTROZOLE SCH MG: 1 TABLET ORAL at 09:10

## 2020-05-19 RX ADMIN — ACETAMINOPHEN PRN MG: 325 TABLET ORAL at 09:11

## 2020-05-19 RX ADMIN — Medication SCH MG: at 21:05

## 2020-05-19 RX ADMIN — Medication SCH MG: at 09:10

## 2020-05-19 RX ADMIN — LIDOCAINE SCH PATCH: 50 PATCH CUTANEOUS at 09:11

## 2020-05-19 RX ADMIN — PREGABALIN SCH MG: 25 CAPSULE ORAL at 17:35

## 2020-05-19 RX ADMIN — Medication SCH MG: at 09:07

## 2020-05-19 RX ADMIN — Medication PRN MG: at 20:40

## 2020-05-19 NOTE — NUR
Patient received in bed, awake and alert. Patient denies any acute distress. Patient 
complains of pain 9 out of 10 d/t the thoracentesis. Prescribed PRN pain medication will be 
given. Dressing intact and no drainage. Patient on 2L NC saturating at 95% Vitals are 
stable. Safety measures in place. Bed low and locked in position. Will continue with the 
plan of care.

## 2020-05-19 NOTE — NUR
Pt is in no acute distress.  PT if for thoracentesis today consent signed.  Pt alert x 2 
forgetful at times.  IV on right ac #20 flushing well. Call light is within reach.

## 2020-05-19 NOTE — NUR
Received pt in bed AOx4 complaining of 9/10 pain on left side of chest. Pt complained why 
thoracentesis was not done yet, explained to her that plan was to do it in the afternoon. On 
O2 @ 2L with no complaints of SOB or distress at this time. IV on right AC 20g flushed and 
patent. Will give pain medication per MD order. Bed locked in lowest position with siderails 
2x up. Call light and cellphone within reach. Will continue to monitor

## 2020-05-19 NOTE — NUR
Pt transferred to 3rd floor accompanied by Young DYE and Thania KRAMER. O2 @ 2L with no SOB or 
distress noted at this time. All belongings with patient. No complaints of pain also at this 
time.

## 2020-05-19 NOTE — NUR
Pt had 360 pleural fluid drained on left side.  No PNTX noted.  Pt breathing even and 
equally on both lungs.  PT is in no acute distress.  Call light is within reach.

## 2020-05-19 NOTE — NUR
AAOx4. In no acute distress. NSR on tele at 89/min. IV site on right AC intact and patent. 
On O2 at 2LPM via NC in place. O2 sat at 93%. Denies any SOB. Tramadol PRN per order given 
for complained of pain on left rib cage area and effective. No adverse effect noted from IB 
ABX. Droplet and contact precaution maintained. Needs attended to and met. Safety measure 
maintained and call bell within reached.

## 2020-05-20 VITALS — SYSTOLIC BLOOD PRESSURE: 126 MMHG | DIASTOLIC BLOOD PRESSURE: 81 MMHG

## 2020-05-20 VITALS — SYSTOLIC BLOOD PRESSURE: 140 MMHG | DIASTOLIC BLOOD PRESSURE: 77 MMHG

## 2020-05-20 VITALS — SYSTOLIC BLOOD PRESSURE: 136 MMHG | DIASTOLIC BLOOD PRESSURE: 77 MMHG

## 2020-05-20 VITALS — SYSTOLIC BLOOD PRESSURE: 150 MMHG | DIASTOLIC BLOOD PRESSURE: 79 MMHG

## 2020-05-20 VITALS — DIASTOLIC BLOOD PRESSURE: 78 MMHG | SYSTOLIC BLOOD PRESSURE: 136 MMHG

## 2020-05-20 VITALS — DIASTOLIC BLOOD PRESSURE: 79 MMHG | SYSTOLIC BLOOD PRESSURE: 150 MMHG

## 2020-05-20 LAB
ALP SERPL-CCNC: 79 U/L (ref 50–136)
ALT SERPL W/O P-5'-P-CCNC: 9 U/L (ref 14–59)
AST SERPL-CCNC: 12 U/L (ref 15–37)
BASOPHILS # BLD AUTO: 0 K/UL (ref 0–8)
BASOPHILS NFR BLD AUTO: 0.9 % (ref 0–2)
BILIRUB SERPL-MCNC: 0.2 MG/DL (ref 0.2–1)
BUN SERPL-MCNC: 35 MG/DL (ref 7–18)
CHLORIDE SERPL-SCNC: 103 MMOL/L (ref 98–107)
CO2 SERPL-SCNC: 27 MMOL/L (ref 21–32)
CREAT SERPL-MCNC: 1 MG/DL (ref 0.6–1.3)
EOSINOPHIL # BLD AUTO: 0 K/UL (ref 0–0.7)
EOSINOPHIL NFR BLD AUTO: 0.2 % (ref 0–7)
GLUCOSE SERPL-MCNC: 111 MG/DL (ref 74–106)
HCT VFR BLD AUTO: 35.3 % (ref 31.2–41.9)
HGB BLD-MCNC: 11.8 G/DL (ref 10.9–14.3)
LYMPHOCYTES # BLD AUTO: 0.6 K/UL (ref 20–40)
LYMPHOCYTES NFR BLD AUTO: 11.2 % (ref 20.5–51.5)
MAGNESIUM SERPL-MCNC: 2.4 MG/DL (ref 1.8–2.4)
MCH RBC QN AUTO: 31.4 UUG (ref 24.7–32.8)
MCHC RBC AUTO-ENTMCNC: 34 G/DL (ref 32.3–35.6)
MCV RBC AUTO: 93.7 FL (ref 75.5–95.3)
MONOCYTES # BLD AUTO: 0.3 K/UL (ref 2–10)
MONOCYTES NFR BLD AUTO: 6.4 % (ref 0–11)
NEUTROPHILS # BLD AUTO: 4.3 K/UL (ref 1.8–8.9)
NEUTROPHILS NFR BLD AUTO: 81.3 % (ref 38.5–71.5)
PHOSPHATE SERPL-MCNC: 2.8 MG/DL (ref 2.5–4.9)
PLATELET # BLD AUTO: 282 K/UL (ref 179–408)
POTASSIUM SERPL-SCNC: 4 MMOL/L (ref 3.5–5.1)
RBC # BLD AUTO: 3.77 MIL/UL (ref 3.63–4.92)
WBC # BLD AUTO: 5.3 K/UL (ref 3.8–11.8)
WS STN SPEC: 7.1 G/DL (ref 6.4–8.2)

## 2020-05-20 RX ADMIN — Medication SCH MG: at 20:14

## 2020-05-20 RX ADMIN — FOLIC ACID SCH MG: 1 TABLET ORAL at 08:55

## 2020-05-20 RX ADMIN — PREGABALIN SCH MG: 25 CAPSULE ORAL at 19:03

## 2020-05-20 RX ADMIN — PREGABALIN SCH MG: 25 CAPSULE ORAL at 12:25

## 2020-05-20 RX ADMIN — Medication PRN MG: at 20:13

## 2020-05-20 RX ADMIN — PANTOPRAZOLE SODIUM SCH MG: 40 TABLET, DELAYED RELEASE ORAL at 06:01

## 2020-05-20 RX ADMIN — Medication SCH MG: at 08:55

## 2020-05-20 RX ADMIN — ANASTROZOLE SCH MG: 1 TABLET ORAL at 08:56

## 2020-05-20 RX ADMIN — LIDOCAINE SCH PATCH: 50 PATCH CUTANEOUS at 08:56

## 2020-05-20 RX ADMIN — Medication SCH MG: at 20:13

## 2020-05-20 RX ADMIN — AMLODIPINE BESYLATE SCH MG: 5 TABLET ORAL at 08:55

## 2020-05-20 RX ADMIN — Medication PRN MG: at 04:32

## 2020-05-20 RX ADMIN — Medication PRN MG: at 09:07

## 2020-05-20 RX ADMIN — Medication PRN MG: at 15:02

## 2020-05-20 RX ADMIN — Medication SCH MCG: at 06:02

## 2020-05-20 RX ADMIN — PREGABALIN SCH MG: 25 CAPSULE ORAL at 06:01

## 2020-05-20 NOTE — NUR
Patient sleeps intermittently throughout the night. Patient denies any acute distress or 
SOB.  Patient on 2L NC and setting WNL. Patient denies any pain at this time. Pain was 
effectively managed by giving prescribed PRN pain medication. Patient's new IV site on L 
wrist 22G is patent and intact. Given all prescribed medications. Incontinence care 
provided. Needs attended. Vitals are stable. Safety measures in place. Bed low and locked 
position. Call lights within reach. Will endorse to the oncoming nurse accordingly.

## 2020-05-20 NOTE — NUR
called and notified that patient is ready for . Patient signed discharged 
paperwork. Belongings are with patient. IV heplock removed. Patient declines to use oxygen 
on the way down and states. "I don't need it." Patient has no further questions. Patient 
discharged home in stable condition.

## 2020-05-20 NOTE — NUR
Received patient awake and alert. Patient shows no signs or symptoms of distress at this 
time. Patient to be discharged home today.  will pick her up. Patient complains of 
having 10/10 pain from procedure site. Will administer PRN medication as ordered. Evening 
medications will given except for ambien. Will continue to monitor patient.

## 2020-05-26 ENCOUNTER — HOSPITAL ENCOUNTER (INPATIENT)
Dept: HOSPITAL 54 - ER | Age: 85
LOS: 9 days | Discharge: HOSPICE | DRG: 177 | End: 2020-06-04
Attending: NURSE PRACTITIONER | Admitting: INTERNAL MEDICINE
Payer: MEDICARE

## 2020-05-26 VITALS — HEIGHT: 60 IN | WEIGHT: 127 LBS | BODY MASS INDEX: 24.94 KG/M2

## 2020-05-26 VITALS — SYSTOLIC BLOOD PRESSURE: 148 MMHG | DIASTOLIC BLOOD PRESSURE: 72 MMHG

## 2020-05-26 DIAGNOSIS — E03.9: ICD-10-CM

## 2020-05-26 DIAGNOSIS — Z91.040: ICD-10-CM

## 2020-05-26 DIAGNOSIS — Z90.10: ICD-10-CM

## 2020-05-26 DIAGNOSIS — Z98.890: ICD-10-CM

## 2020-05-26 DIAGNOSIS — J90: ICD-10-CM

## 2020-05-26 DIAGNOSIS — I10: ICD-10-CM

## 2020-05-26 DIAGNOSIS — Z88.2: ICD-10-CM

## 2020-05-26 DIAGNOSIS — M41.9: ICD-10-CM

## 2020-05-26 DIAGNOSIS — I21.A1: ICD-10-CM

## 2020-05-26 DIAGNOSIS — I42.2: ICD-10-CM

## 2020-05-26 DIAGNOSIS — E87.1: ICD-10-CM

## 2020-05-26 DIAGNOSIS — Z85.3: ICD-10-CM

## 2020-05-26 DIAGNOSIS — K21.9: ICD-10-CM

## 2020-05-26 DIAGNOSIS — I35.8: ICD-10-CM

## 2020-05-26 DIAGNOSIS — K44.9: ICD-10-CM

## 2020-05-26 DIAGNOSIS — Z96.652: ICD-10-CM

## 2020-05-26 DIAGNOSIS — G89.4: ICD-10-CM

## 2020-05-26 DIAGNOSIS — Z91.018: ICD-10-CM

## 2020-05-26 DIAGNOSIS — J98.4: ICD-10-CM

## 2020-05-26 DIAGNOSIS — N17.0: ICD-10-CM

## 2020-05-26 DIAGNOSIS — E43: ICD-10-CM

## 2020-05-26 DIAGNOSIS — Z86.79: ICD-10-CM

## 2020-05-26 DIAGNOSIS — B37.49: ICD-10-CM

## 2020-05-26 DIAGNOSIS — I87.8: ICD-10-CM

## 2020-05-26 DIAGNOSIS — I87.2: ICD-10-CM

## 2020-05-26 DIAGNOSIS — Z90.710: ICD-10-CM

## 2020-05-26 DIAGNOSIS — Z79.899: ICD-10-CM

## 2020-05-26 DIAGNOSIS — J15.6: Primary | ICD-10-CM

## 2020-05-26 DIAGNOSIS — J98.11: ICD-10-CM

## 2020-05-26 DIAGNOSIS — J85.1: ICD-10-CM

## 2020-05-26 DIAGNOSIS — G93.41: ICD-10-CM

## 2020-05-26 DIAGNOSIS — R33.9: ICD-10-CM

## 2020-05-26 DIAGNOSIS — Z88.1: ICD-10-CM

## 2020-05-26 DIAGNOSIS — H91.90: ICD-10-CM

## 2020-05-26 DIAGNOSIS — K44.0: ICD-10-CM

## 2020-05-26 LAB
ALBUMIN SERPL BCP-MCNC: 2.4 G/DL (ref 3.4–5)
ALP SERPL-CCNC: 119 U/L (ref 46–116)
ALT SERPL W P-5'-P-CCNC: 39 U/L (ref 12–78)
AST SERPL W P-5'-P-CCNC: 47 U/L (ref 15–37)
BASOPHILS # BLD AUTO: 0.1 /CMM (ref 0–0.2)
BASOPHILS NFR BLD AUTO: 0.6 % (ref 0–2)
BILIRUB DIRECT SERPL-MCNC: 0.1 MG/DL (ref 0–0.2)
BILIRUB SERPL-MCNC: 0.5 MG/DL (ref 0.2–1)
BUN SERPL-MCNC: 22 MG/DL (ref 7–18)
CALCIUM SERPL-MCNC: 9.2 MG/DL (ref 8.5–10.1)
CHLORIDE SERPL-SCNC: 101 MMOL/L (ref 98–107)
CO2 SERPL-SCNC: 26 MMOL/L (ref 21–32)
CREAT SERPL-MCNC: 1 MG/DL (ref 0.6–1.3)
EOSINOPHIL NFR BLD AUTO: 0.3 % (ref 0–6)
GLUCOSE SERPL-MCNC: 117 MG/DL (ref 74–106)
HCT VFR BLD AUTO: 39 % (ref 33–45)
HGB BLD-MCNC: 12.8 G/DL (ref 11.5–14.8)
LYMPHOCYTES NFR BLD AUTO: 1.2 /CMM (ref 0.8–4.8)
LYMPHOCYTES NFR BLD AUTO: 9 % (ref 20–44)
MCHC RBC AUTO-ENTMCNC: 33 G/DL (ref 31–36)
MCV RBC AUTO: 94 FL (ref 82–100)
MONOCYTES NFR BLD AUTO: 0.7 /CMM (ref 0.1–1.3)
MONOCYTES NFR BLD AUTO: 5.5 % (ref 2–12)
NEUTROPHILS # BLD AUTO: 10.9 /CMM (ref 1.8–8.9)
NEUTROPHILS NFR BLD AUTO: 84.6 % (ref 43–81)
PLATELET # BLD AUTO: 462 /CMM (ref 150–450)
POTASSIUM SERPL-SCNC: 4 MMOL/L (ref 3.5–5.1)
PROT SERPL-MCNC: 7.7 G/DL (ref 6.4–8.2)
RBC # BLD AUTO: 4.15 MIL/UL (ref 4–5.2)
SODIUM SERPL-SCNC: 138 MMOL/L (ref 136–145)
WBC NRBC COR # BLD AUTO: 12.9 K/UL (ref 4.3–11)

## 2020-05-26 PROCEDURE — A4216 STERILE WATER/SALINE, 10 ML: HCPCS

## 2020-05-26 PROCEDURE — C1750 CATH, HEMODIALYSIS,LONG-TERM: HCPCS

## 2020-05-26 PROCEDURE — G0378 HOSPITAL OBSERVATION PER HR: HCPCS

## 2020-05-26 PROCEDURE — A6253 ABSORPT DRG > 48 SQ IN W/O B: HCPCS

## 2020-05-26 NOTE — NUR
pt is allergic to cephalaxin, called  to verify, unknown reaction. per 
dr. jodie phillips to give and watch for any allergic rxn

## 2020-05-26 NOTE — NUR
BIB  From Home "told to come in for admission/Lung infection/empyemam, 
pt aaox4, -sob, nad noted, placed on monitor, vss, pending er provider cheli

## 2020-05-27 VITALS — SYSTOLIC BLOOD PRESSURE: 130 MMHG | DIASTOLIC BLOOD PRESSURE: 70 MMHG

## 2020-05-27 VITALS — SYSTOLIC BLOOD PRESSURE: 129 MMHG | DIASTOLIC BLOOD PRESSURE: 81 MMHG

## 2020-05-27 LAB
BASOPHILS # BLD AUTO: 0 /CMM (ref 0–0.2)
BASOPHILS NFR BLD AUTO: 0.1 % (ref 0–2)
BUN SERPL-MCNC: 21 MG/DL (ref 7–18)
CALCIUM SERPL-MCNC: 8.4 MG/DL (ref 8.5–10.1)
CHLORIDE SERPL-SCNC: 101 MMOL/L (ref 98–107)
CO2 SERPL-SCNC: 27 MMOL/L (ref 21–32)
CREAT SERPL-MCNC: 0.8 MG/DL (ref 0.6–1.3)
EOSINOPHIL NFR BLD AUTO: 0.3 % (ref 0–6)
GLUCOSE SERPL-MCNC: 105 MG/DL (ref 74–106)
HCT VFR BLD AUTO: 36 % (ref 33–45)
HGB BLD-MCNC: 12 G/DL (ref 11.5–14.8)
LYMPHOCYTES NFR BLD AUTO: 0.9 /CMM (ref 0.8–4.8)
LYMPHOCYTES NFR BLD AUTO: 8.4 % (ref 20–44)
MCHC RBC AUTO-ENTMCNC: 33 G/DL (ref 31–36)
MCV RBC AUTO: 95 FL (ref 82–100)
MONOCYTES NFR BLD AUTO: 0.8 /CMM (ref 0.1–1.3)
MONOCYTES NFR BLD AUTO: 7.8 % (ref 2–12)
NEUTROPHILS # BLD AUTO: 8.5 /CMM (ref 1.8–8.9)
NEUTROPHILS NFR BLD AUTO: 83.4 % (ref 43–81)
PLATELET # BLD AUTO: 350 /CMM (ref 150–450)
POTASSIUM SERPL-SCNC: 3.7 MMOL/L (ref 3.5–5.1)
RBC # BLD AUTO: 3.83 MIL/UL (ref 4–5.2)
SODIUM SERPL-SCNC: 136 MMOL/L (ref 136–145)
WBC NRBC COR # BLD AUTO: 10.2 K/UL (ref 4.3–11)

## 2020-05-27 PROCEDURE — 0W9B3ZZ DRAINAGE OF LEFT PLEURAL CAVITY, PERCUTANEOUS APPROACH: ICD-10-PCS

## 2020-05-27 RX ADMIN — METOPROLOL TARTRATE SCH MG: 50 TABLET, FILM COATED ORAL at 21:59

## 2020-05-27 RX ADMIN — LEVOTHYROXINE SODIUM SCH MCG: 50 TABLET ORAL at 08:30

## 2020-05-27 RX ADMIN — METOPROLOL TARTRATE SCH MG: 50 TABLET, FILM COATED ORAL at 09:00

## 2020-05-27 RX ADMIN — Medication PRN MG: at 11:07

## 2020-05-27 RX ADMIN — PREGABALIN SCH MG: 25 CAPSULE ORAL at 09:00

## 2020-05-27 RX ADMIN — FOLIC ACID SCH MG: 1 TABLET ORAL at 09:00

## 2020-05-27 RX ADMIN — PANTOPRAZOLE SODIUM SCH MG: 40 TABLET, DELAYED RELEASE ORAL at 09:00

## 2020-05-27 RX ADMIN — Medication SCH MLS/HR: at 23:08

## 2020-05-27 RX ADMIN — BUPROPION HYDROCHLORIDE SCH MG: 150 TABLET, EXTENDED RELEASE ORAL at 09:00

## 2020-05-27 RX ADMIN — PREGABALIN SCH MG: 25 CAPSULE ORAL at 15:22

## 2020-05-27 RX ADMIN — PREGABALIN SCH MG: 25 CAPSULE ORAL at 18:11

## 2020-05-27 RX ADMIN — DEXTROSE MONOHYDRATE SCH MLS/HR: 50 INJECTION, SOLUTION INTRAVENOUS at 15:22

## 2020-05-27 RX ADMIN — Medication SCH OZ: at 10:30

## 2020-05-27 RX ADMIN — AMLODIPINE BESYLATE SCH MG: 5 TABLET ORAL at 09:00

## 2020-05-27 RX ADMIN — ANASTROZOLE SCH MG: 1 TABLET ORAL at 09:00

## 2020-05-27 RX ADMIN — ZOLPIDEM TARTRATE SCH MG: 10 TABLET, FILM COATED ORAL at 18:11

## 2020-05-27 NOTE — NUR
MS/TELE/RN

PATIENT IS STILL SLEEPING, APPEAR COMFORTABLE, NO SIGNS OF DISTRESS NOTED, CALL LIGHT IN 
REACH, ALL NEEDS ATTENDED AT THIS TIME, WILL CONTINUE TO MONITOR.

## 2020-05-27 NOTE — NUR
WOUND CARE CONSULT: PT REFUSED SKIN ASSESSMENT BUT ALLOWED ASSESSMENT OF FEET ONLY. RT FOOT 
NOTED TO HAVE DUSKY COLOR WITH PURPLE DISCOLORATION TO RT LATERAL ANKLE WITH EDEMA. LEFT 
FOOT HAS SLIGHT DUSKY COLOR WITH PLANTAR CALLUS, PRESENT ON ADMISSION. CURRENT MARCELA SCORE 
IS 15. RECOMMENDATIONS MADE FOR SKIN PROTECTION AND DISCUSSED WITH NURSING STAFF. WILL SEE 
PRN. MD IN AGREEMENT WITH PLAN OF CARE.

## 2020-05-27 NOTE — NUR
MS/TELE/RN

PATIENT IS SLEEPING AT THIS TIME, APPEAR COMFORTABLE, NO SIGNS OF DISTRESS NOTED, CALL LIGHT 
IN REACH. WILL CONTINUE TO MONITOR.

## 2020-05-27 NOTE — NUR
MS/TELE/RN

RECEIVED PATIENT FROM [facility] BY PAVEL AT AROUND 1365. PATIENT WAS AWAKE, ALERT, ORIENTED, 
COMFORTABLE, NO SIGNS OF DISTRESS NOTED. PATIENT IS VERY HARD OF HEARING, UNABLE TO OBTAIN 
INFORMATION. ADMISSION INFORMATION WAS TAKEN FROM THE ER NOTES AND CHART OF THE PATIENT. 
MADE PATIENT COMFORTABLE IN BED, PHYSICAL ASSESSMENT WAS DONE, PHOTOS WERE TAKEN AND PUT IN 
CHART. FALL PRECAUTIONS AS PER PROTOCOL INSTITUTED. WILL MONITOR.

## 2020-05-27 NOTE — NUR
ms rn

received on bed, awake,alert,oriented x4,not in any form of distress, respirations even and 
ulabored,no sob noted.denies pain at thistime

## 2020-05-27 NOTE — NUR
ms lvn initial notes

 received report from am nurse and checked pt she is sleeping at this time per am  nurse 
Elizabeth she already given the sleep meds for tonight as schedule. Respiration even and 
non-labored not in any distress noted. Heplock patent and intact. kept her warm and 
comfortable at all times. place call light at reach. will continue monitoring.

## 2020-05-28 VITALS — SYSTOLIC BLOOD PRESSURE: 89 MMHG | DIASTOLIC BLOOD PRESSURE: 51 MMHG

## 2020-05-28 VITALS — SYSTOLIC BLOOD PRESSURE: 98 MMHG | DIASTOLIC BLOOD PRESSURE: 65 MMHG

## 2020-05-28 VITALS — DIASTOLIC BLOOD PRESSURE: 58 MMHG | SYSTOLIC BLOOD PRESSURE: 91 MMHG

## 2020-05-28 VITALS — SYSTOLIC BLOOD PRESSURE: 92 MMHG | DIASTOLIC BLOOD PRESSURE: 47 MMHG

## 2020-05-28 VITALS — DIASTOLIC BLOOD PRESSURE: 68 MMHG | SYSTOLIC BLOOD PRESSURE: 93 MMHG

## 2020-05-28 VITALS — DIASTOLIC BLOOD PRESSURE: 55 MMHG | SYSTOLIC BLOOD PRESSURE: 99 MMHG

## 2020-05-28 LAB
BUN SERPL-MCNC: 20 MG/DL (ref 7–18)
CALCIUM SERPL-MCNC: 8.8 MG/DL (ref 8.5–10.1)
CHLORIDE SERPL-SCNC: 98 MMOL/L (ref 98–107)
CO2 SERPL-SCNC: 21 MMOL/L (ref 21–32)
CREAT SERPL-MCNC: 1.5 MG/DL (ref 0.6–1.3)
GLUCOSE SERPL-MCNC: 132 MG/DL (ref 74–106)
POTASSIUM SERPL-SCNC: 4.1 MMOL/L (ref 3.5–5.1)
SODIUM SERPL-SCNC: 133 MMOL/L (ref 136–145)

## 2020-05-28 RX ADMIN — PREGABALIN SCH MG: 25 CAPSULE ORAL at 16:39

## 2020-05-28 RX ADMIN — ZOLPIDEM TARTRATE SCH MG: 10 TABLET, FILM COATED ORAL at 19:02

## 2020-05-28 RX ADMIN — PANTOPRAZOLE SODIUM SCH MG: 40 TABLET, DELAYED RELEASE ORAL at 08:39

## 2020-05-28 RX ADMIN — ZOLPIDEM TARTRATE SCH MG: 10 TABLET, FILM COATED ORAL at 21:23

## 2020-05-28 RX ADMIN — PREGABALIN SCH MG: 25 CAPSULE ORAL at 08:39

## 2020-05-28 RX ADMIN — Medication PRN MG: at 06:44

## 2020-05-28 RX ADMIN — Medication SCH MLS/HR: at 22:51

## 2020-05-28 RX ADMIN — Medication PRN MG: at 01:21

## 2020-05-28 RX ADMIN — TRAMADOL HYDROCHLORIDE PRN MG: 50 TABLET, FILM COATED ORAL at 16:40

## 2020-05-28 RX ADMIN — PREGABALIN SCH MG: 25 CAPSULE ORAL at 12:24

## 2020-05-28 RX ADMIN — BUPROPION HYDROCHLORIDE SCH MG: 150 TABLET, EXTENDED RELEASE ORAL at 08:39

## 2020-05-28 RX ADMIN — FOLIC ACID SCH MG: 1 TABLET ORAL at 08:38

## 2020-05-28 RX ADMIN — ANASTROZOLE SCH MG: 1 TABLET ORAL at 08:38

## 2020-05-28 RX ADMIN — LEVOTHYROXINE SODIUM SCH MCG: 50 TABLET ORAL at 06:55

## 2020-05-28 RX ADMIN — DEXTROSE MONOHYDRATE SCH MLS/HR: 50 INJECTION, SOLUTION INTRAVENOUS at 08:59

## 2020-05-28 RX ADMIN — METOPROLOL TARTRATE SCH MG: 50 TABLET, FILM COATED ORAL at 08:39

## 2020-05-28 RX ADMIN — Medication PRN MG: at 18:40

## 2020-05-28 RX ADMIN — Medication SCH OZ: at 08:41

## 2020-05-28 RX ADMIN — METOPROLOL TARTRATE SCH MG: 50 TABLET, FILM COATED ORAL at 21:00

## 2020-05-28 RX ADMIN — AMLODIPINE BESYLATE SCH MG: 5 TABLET ORAL at 08:39

## 2020-05-28 NOTE — NUR
MS RN OPENING NOTES

Received Patient awake and resting in bed. A/O x 4. VS stable with no acute distress. 
Breathing even and unlabored on 3LPM via NC with no respiratory distress. Patient stated 
tolerable mild pain on thoracentesis site. Provided comfort measures. 20g PIV on RAC clean, 
intact, patent and flushing well. Safety precautions in place. Bed locked and set to lowest 
position with side rails x 2 up. All needs rendered at this time. Call light within reach. 
Will continue to monitor.

## 2020-05-28 NOTE — NUR
RN NOTES:

PT A/O X3 (ABLE TO STATE HER NAME,  AND WHERE SHE IS), HOWEVER UNABLE TO RECALL MONTH DAY 
AND YEAR. ON 3L OXYGEN VIA NC.

## 2020-05-28 NOTE — NUR
rn notes/post bolus vs:

attached latest vs post bolus, pt a/o x3, remains on 3l oxygen via nc, respiration even and 
unlabored, provided with cranberry juice and pudding, will recheck vs again in an hour

-------------------------------------------------------------------------------

Addendum: 05/28/20 at 2247 by JIMMY DIXON RN

-------------------------------------------------------------------------------

Amended: Links added.

## 2020-05-28 NOTE — NUR
MS RN NOTES  PAIN MANAGEMENT

C/O GENERALIZED ARTHRITIS PAIN 10/10 ON PAIN SCALE.MEDICATED WITH MORPHINE 4MG IV AS ORDERED 
FOR SEVERE PAIN.WILL MONITOR FOR RELIEF

## 2020-05-28 NOTE — NUR
MS RN CLOSING NOTES

Patient resting in bed. A/O x 3. VS stable with no acute distress. Breathing even and 
unlabored on 3LPM via NC with no respiratory distress. Patient stated generalized moderate 
pain. Provided comfort measures. Will endorse to oncoming shift. 20g PIV on RAC clean, 
intact, patent and flushing well. Safety precautions in place. Bed locked and set to lowest 
position with side rails x 2 up. All needs rendered at this time. Call light within reach. 
Will endorse plan of care to oncoming shift.

## 2020-05-28 NOTE — NUR
MS RN NOTES

Obtained Pleural Fluid Lab results from LELA in [facility]. Per Leonard ALEXIS 
request, notified Leonard ALEXIS of lab results at this time. Placed lab results in chart. Patient 
in stable condition. Will continue to monitor.

## 2020-05-28 NOTE — NUR
RN NOTES/NEPHRO MD RETURN CALL:

SPOKE WITH DR CARBALLO, RELAYED SITUATION,LOW BP, MANUAL BP HIGHEST OBTAIN IS 91/58 HR 
74,  PER MD TELEPHONE ORDER RECEIVE TO GIVE 500ML NORMAL SALINE BOLUS, HOLD LOPRESSOR DOSE  
DUE TO DECREASE BLOOD PRESSURE, PARAMETER FOR LOPRESSOR HOLD FOR SBP <105, INFORMED MD PT 
INSISTING TO GET HER SLEEPING PILL/AMBIEN, PER MD DO NOT GIVE AMBIEN TONIGHT. ALL ORDERS 
READ BACK VERIFIED AND CARRIED OUT.

## 2020-05-28 NOTE — NUR
ms lvn clsramonita notes

 morphine given tiffany IVP by another nurse as ordered per pt requested for her generalize pain 
10/10. .stable tiffany the night and slept well. no signs of any distress noted. all due meds 
given . still waiting for vanco trough result. endorse to am nurse for continuity of care. 
will continue monitoring. place call light at reach.

## 2020-05-28 NOTE — NUR
MS RN NOTES  PAIN MANAGEMENT

C/O GENERALIZED PAIN DUE TO ARTHRITIS 8/10 ON PAIN SCALE THIS TIME.MEDICATED WITH MORPHINE 
4MG IV AS ORDERED AND PER PATIENT REQUEST.

## 2020-05-29 VITALS — DIASTOLIC BLOOD PRESSURE: 64 MMHG | SYSTOLIC BLOOD PRESSURE: 102 MMHG

## 2020-05-29 VITALS — DIASTOLIC BLOOD PRESSURE: 46 MMHG | SYSTOLIC BLOOD PRESSURE: 86 MMHG

## 2020-05-29 VITALS — DIASTOLIC BLOOD PRESSURE: 41 MMHG | SYSTOLIC BLOOD PRESSURE: 110 MMHG

## 2020-05-29 VITALS — SYSTOLIC BLOOD PRESSURE: 102 MMHG | DIASTOLIC BLOOD PRESSURE: 54 MMHG

## 2020-05-29 LAB
BASOPHILS # BLD AUTO: 0 /CMM (ref 0–0.2)
BASOPHILS NFR BLD AUTO: 0.3 % (ref 0–2)
BUN SERPL-MCNC: 28 MG/DL (ref 7–18)
CALCIUM SERPL-MCNC: 8.3 MG/DL (ref 8.5–10.1)
CHLORIDE SERPL-SCNC: 97 MMOL/L (ref 98–107)
CO2 SERPL-SCNC: 22 MMOL/L (ref 21–32)
CREAT SERPL-MCNC: 3 MG/DL (ref 0.6–1.3)
EOSINOPHIL NFR BLD AUTO: 0.2 % (ref 0–6)
GLUCOSE SERPL-MCNC: 98 MG/DL (ref 74–106)
HCT VFR BLD AUTO: 33 % (ref 33–45)
HGB BLD-MCNC: 10.8 G/DL (ref 11.5–14.8)
LYMPHOCYTES NFR BLD AUTO: 0.9 /CMM (ref 0.8–4.8)
LYMPHOCYTES NFR BLD AUTO: 10.1 % (ref 20–44)
MAGNESIUM SERPL-MCNC: 2.1 MG/DL (ref 1.8–2.4)
MCHC RBC AUTO-ENTMCNC: 33 G/DL (ref 31–36)
MCV RBC AUTO: 95 FL (ref 82–100)
MONOCYTES NFR BLD AUTO: 0.6 /CMM (ref 0.1–1.3)
MONOCYTES NFR BLD AUTO: 6.5 % (ref 2–12)
NEUTROPHILS # BLD AUTO: 7.7 /CMM (ref 1.8–8.9)
NEUTROPHILS NFR BLD AUTO: 82.9 % (ref 43–81)
PHOSPHATE SERPL-MCNC: 3.9 MG/DL (ref 2.5–4.9)
PLATELET # BLD AUTO: 426 /CMM (ref 150–450)
POTASSIUM SERPL-SCNC: 4.3 MMOL/L (ref 3.5–5.1)
RBC # BLD AUTO: 3.48 MIL/UL (ref 4–5.2)
SODIUM SERPL-SCNC: 130 MMOL/L (ref 136–145)
TSH SERPL DL<=0.005 MIU/L-ACNC: 3.72 UIU/ML (ref 0.36–3.74)
URATE SERPL-MCNC: 6.6 MG/DL (ref 2.6–7.2)
WBC NRBC COR # BLD AUTO: 9.3 K/UL (ref 4.3–11)

## 2020-05-29 RX ADMIN — BUPROPION HYDROCHLORIDE SCH MG: 150 TABLET, EXTENDED RELEASE ORAL at 08:39

## 2020-05-29 RX ADMIN — Medication SCH MLS/HR: at 23:00

## 2020-05-29 RX ADMIN — LEVOTHYROXINE SODIUM SCH MCG: 50 TABLET ORAL at 08:24

## 2020-05-29 RX ADMIN — PREGABALIN SCH MG: 25 CAPSULE ORAL at 13:27

## 2020-05-29 RX ADMIN — ANASTROZOLE SCH MG: 1 TABLET ORAL at 08:45

## 2020-05-29 RX ADMIN — Medication SCH OZ: at 08:46

## 2020-05-29 RX ADMIN — PREGABALIN SCH MG: 25 CAPSULE ORAL at 08:39

## 2020-05-29 RX ADMIN — AMLODIPINE BESYLATE SCH MG: 5 TABLET ORAL at 08:46

## 2020-05-29 RX ADMIN — PREGABALIN SCH MG: 25 CAPSULE ORAL at 16:57

## 2020-05-29 RX ADMIN — FOLIC ACID SCH MG: 1 TABLET ORAL at 08:39

## 2020-05-29 RX ADMIN — PANTOPRAZOLE SODIUM SCH MG: 40 TABLET, DELAYED RELEASE ORAL at 08:39

## 2020-05-29 RX ADMIN — SODIUM CHLORIDE PRN MLS/HR: 9 INJECTION, SOLUTION INTRAVENOUS at 13:28

## 2020-05-29 RX ADMIN — METOPROLOL TARTRATE SCH MG: 50 TABLET, FILM COATED ORAL at 08:45

## 2020-05-29 RX ADMIN — ZOLPIDEM TARTRATE SCH MG: 10 TABLET, FILM COATED ORAL at 18:07

## 2020-05-29 NOTE — NUR
MS RN OPENING NOTES

PATIENT SLEEPING IN BED, EASY TO AWAKEN. A/O X3. ON 3L NC. NO S/S OF SOB OR PAIN NOTED. IV 
PRESENT ON LEFT FA, SIZE 22, INTACT & PATENT, NS RUNNING  ML/HR. SAFETY MEASURES IN 
PLACE AND PATIENT'S NEEDS MET. BED LOCKED, ALARM ON, SIDE RAILS X2, CALL LIGHT WITHIN REACH. 
WILL CONTINUE TO MONITOR.

## 2020-05-29 NOTE — NUR
rn notes/vs 1hr post bolus:

attached pt's latest vs, pt responsive, a/o x3, remains on 3L oxygen via nc respirations 
even and unlabored, iv access remains patent and flushing well, on hl, pt denies any head 
ache, dizziness, or light headedness, denies any discomfort at this time, will continue 
monitoring pt. 

-------------------------------------------------------------------------------

Addendum: 05/29/20 at 9134 by JIMMY DIXON RN

-------------------------------------------------------------------------------

Amended: Links added.

## 2020-05-29 NOTE — NUR
PT HAS VANCOMYCIN 1GM IV Q24HRS SCHEDULED AT THIS TIME, PT VANCO TROUGH LEVEL IS 20 AS OF 
05/28/20. ISAURA FROM PHARMACY WAS MADE AWARE, PER ISAURA, HOLD VANCO FOR NOW.

## 2020-05-29 NOTE — NUR
MS RN OPENING NOTES

RECEIVED PT IN BED AWAKE AT THIS TIME, AOX3. NO SOB NOTED, NO S/S OF ANY ACUTE DISTRESS 
NOTED AT THIS TIME, NO C/O PAIN. PT ON 3LPM O2 VIA NC. RESPIRATIONS EVEN AND UNLABORED WITH 
EQUAL RISE AND FALL IN CHEST. IV ACCESS TO LFA G#22, INTACT AND PATENT. BLE +2 NOTED. SAFETY 
MEASURES IN PLACE, BED IN LOWEST LOCKED POSITION, HOB ELEVATED TO SEMI FOWLERS POSITION, 
SIDE RAILS UP, CALL LIGHT WITHIN REACH. WILL CONTINUE TO MONITOR

## 2020-05-29 NOTE — NUR
end of shift report:

received report form hellen galindo last night at 192. s/p thoracentesis on .  pt remains 
a/o x3 (able to state name,  and place where she is), remains on 3l oxygen via nc spo2 
ranging 92-94%. iv access remains patent and flushing well, on hl, no s/s of iv infiltration 
noted. ble kept offloaded on pillows, no scd applied as pt noted to have ble edema +2. 
Lopressor scheduled at 2100 was held by md due to low bp, also Ambien held per md order. pt 
received 500ml ns bolus for managing hypotension. result of fluid analysis from [facility] 
relayed to dr mckeon by day rn yesterday. am care and complete linen change provided to pt.  
vs remains stable, needs attended, safety precautions for fall remains engaged, call light 
in reach, will endorse to day rn Immaculate for continuity of care.

## 2020-05-29 NOTE — NUR
PT SODIUM LEVEL , BP 86/46 HR 89. DR BENAVIDES MADE AWARE. NO NEW ORDERS AT THIS 
TIME. WILL CONTINUE TO MONITOR

## 2020-05-29 NOTE — NUR
MS RN NOTES

DR. CARBALLO CALLED AND MADE AWARE OF PHARMACY'S RECOMMENDATION REGARDING ANTIBIOTIC 
LEVAQUIN. PER KAIT, OK TO HOLD MEDICATION TONIGHT AND CHANGE FREQUENCY TO Q48H. ALSO 
RECEIVED ORDERS FOR STRAIGHT CATH TO COLLECT URINE. ORDERS READ BACK AND VERIFIED.

## 2020-05-29 NOTE — NUR
PT HAS VANCOMYCIN 1GM IV Q24HRS SCHEDULED AT THIS TIME, PT VANCO TROUGH LEVEL IS 20 AS OF 
05/28/20. ISAURA FROM PHARMACY WAS MADE AWARE, PER ISAURA, HOLD VANCO FOR NOW. ORDERS CARRIED 
OUT. WILL CONTINUE TO MONITOR

## 2020-05-29 NOTE — NUR
TRO,  REQUESTED THAT PT'S FAMILY BRING IN HOME MEDICATION (IBRANCE 75MG PO 
DAILY).  WAS MADE AWARE AT THIS TIME AND WILL BE BRINGING IN MEDICATION BY 1700PM. 
WILL CONTINUE CARE

## 2020-05-29 NOTE — NUR
PATIENT'S  BROUGHT IN PATIENT'S HOME MEDICATION IBRIANCE 75MG PO DAILY  PER PHARMACY 
REQUEST. MEDICATION NOT IN ORIGINAL CONTAINER. MEDICATION HANDED TO TERRENCE, PHARMACY TECH. 
PER TERRENCE, SHE WILL VERIFY MEDICATION. CHARGE NURSE CLAUDIA, WAS MADE AWARE. 

 

PATIENT'S  ALSO BROUGHT IN A COPY OF PATIENT'S DURABLE POWER OF  FILED IN 
PATIENT'S CHARGE.  AND CHARGE NURSE MADE AWARE. 

WILL CONTINUE WITH PLAN OF CARE.

## 2020-05-29 NOTE — NUR
MS RN CLOSING ING NOTES



PT IN BED AWAKE AT THIS TIME. PT REMAINED STABLE THROUGHOUT SHIFT. PT KEPT CLEAN AND DRY. 
ALL CARE, MEDICATIONS AND TREATMENT PROVIDED AS ANTICIPATED AND PER ORDER. PT REPOSITION 
Q2HRS, PRN AND PER PROTOCOL. IV ACCESS TO LFA G#22 INTACT, PATENT AND RUNNING 
0.9NS@100ML/HR. NO SOB NOTED, NO S/S OF ANY ACUTE DISTRESS NOTED AT THIS TIME. ASPIRATION 
AND SAFETY PRECAUTIONS IN PLACE, BED IN LOWEST LOCKED POSITION, HOB ELEVATED TO SEMI FOWLERS 
POSITION, SIDE RAILS UP, CALL LIGHT WITHIN REACH. WILL ENDORSE TO NIGHT SHIFT NURSE FOR ELYSSA

## 2020-05-29 NOTE — NUR
PT C/O O NAUSEA AT THIS TIME. DR CARBALLO MADE AWARE. NO NEW ORDERS AT THIS TIME, WILL 
CONTINUE TO MONITOR

## 2020-05-29 NOTE — NUR
rn notes:

medication ambien 10mg tab returned to [facility], cosigned/witnessed by josie barnes. 

(the said medication was pulled out by josie macedo from day shift, but not administered at 
night as dose was held by md due to low bp, spoked with pharmacist how to return it as med 
was not pulled pout under my name, per pharm okay to return it as long they as there is 
another rn as witness.

## 2020-05-29 NOTE — NUR
MS RN NOTES

CALLED [facility] REGARDING SCHEDULED IVPB LEVAQUIN AND PATIENT'S RENAL FUNCTION. PER 
PHARMACIST TECH, PATIENT'S CREATININE CLEARANCE IS <10 (9.67) AND RECOMMENDATION IS TO HOLD 
ANTIBIOTIC AND CHANGE FREQUENCY TO Q48H.

## 2020-05-30 VITALS — DIASTOLIC BLOOD PRESSURE: 77 MMHG | SYSTOLIC BLOOD PRESSURE: 143 MMHG

## 2020-05-30 VITALS — DIASTOLIC BLOOD PRESSURE: 68 MMHG | SYSTOLIC BLOOD PRESSURE: 121 MMHG

## 2020-05-30 VITALS — SYSTOLIC BLOOD PRESSURE: 137 MMHG | DIASTOLIC BLOOD PRESSURE: 59 MMHG

## 2020-05-30 LAB
ALBUMIN SERPL BCP-MCNC: 1.8 G/DL (ref 3.4–5)
ALP SERPL-CCNC: 90 U/L (ref 46–116)
ALT SERPL W P-5'-P-CCNC: 31 U/L (ref 12–78)
AST SERPL W P-5'-P-CCNC: 43 U/L (ref 15–37)
BASOPHILS # BLD AUTO: 0 /CMM (ref 0–0.2)
BASOPHILS NFR BLD AUTO: 0.5 % (ref 0–2)
BILIRUB SERPL-MCNC: 0.2 MG/DL (ref 0.2–1)
BUN SERPL-MCNC: 34 MG/DL (ref 7–18)
BUN SERPL-MCNC: 35 MG/DL (ref 7–18)
CALCIUM SERPL-MCNC: 7.4 MG/DL (ref 8.5–10.1)
CALCIUM SERPL-MCNC: 7.6 MG/DL (ref 8.5–10.1)
CHLORIDE SERPL-SCNC: 101 MMOL/L (ref 98–107)
CHLORIDE SERPL-SCNC: 99 MMOL/L (ref 98–107)
CHLORIDE UR-SCNC: 40 MMOL/L (ref 55–125)
CK SERPL-CCNC: 223 U/L (ref 26–192)
CO2 SERPL-SCNC: 25 MMOL/L (ref 21–32)
CO2 SERPL-SCNC: 25 MMOL/L (ref 21–32)
CREAT SERPL-MCNC: 4.1 MG/DL (ref 0.6–1.3)
CREAT SERPL-MCNC: 4.4 MG/DL (ref 0.6–1.3)
CREAT UR-MCNC: 130.8 MG/DL (ref 30–125)
EOSINOPHIL NFR BLD AUTO: 0.2 % (ref 0–6)
GLUCOSE SERPL-MCNC: 128 MG/DL (ref 74–106)
GLUCOSE SERPL-MCNC: 128 MG/DL (ref 74–106)
HCT VFR BLD AUTO: 30 % (ref 33–45)
HGB BLD-MCNC: 9.6 G/DL (ref 11.5–14.8)
LYMPHOCYTES NFR BLD AUTO: 1.1 /CMM (ref 0.8–4.8)
LYMPHOCYTES NFR BLD AUTO: 13 % (ref 20–44)
MAGNESIUM SERPL-MCNC: 1.9 MG/DL (ref 1.8–2.4)
MCHC RBC AUTO-ENTMCNC: 33 G/DL (ref 31–36)
MCV RBC AUTO: 95 FL (ref 82–100)
MONOCYTES NFR BLD AUTO: 0.7 /CMM (ref 0.1–1.3)
MONOCYTES NFR BLD AUTO: 8.6 % (ref 2–12)
NEUTROPHILS # BLD AUTO: 6.5 /CMM (ref 1.8–8.9)
NEUTROPHILS NFR BLD AUTO: 77.7 % (ref 43–81)
OSMOLALITY UR: 429 MOS/KG (ref 340–1090)
PHOSPHATE SERPL-MCNC: 4.9 MG/DL (ref 2.5–4.9)
PLATELET # BLD AUTO: 368 /CMM (ref 150–450)
POTASSIUM SERPL-SCNC: 4.2 MMOL/L (ref 3.5–5.1)
POTASSIUM SERPL-SCNC: 4.3 MMOL/L (ref 3.5–5.1)
POTASSIUM UR-SCNC: 38 MMOL/L (ref 25–125)
PROT SERPL-MCNC: 5.9 G/DL (ref 6.4–8.2)
PROT UR-MCNC: 73.6 MG/DL (ref 0–11.9)
RBC # BLD AUTO: 3.11 MIL/UL (ref 4–5.2)
SODIUM SERPL-SCNC: 134 MMOL/L (ref 136–145)
SODIUM SERPL-SCNC: 136 MMOL/L (ref 136–145)
SODIUM UR-SCNC: 19 MMOL/L (ref 40–220)
WBC NRBC COR # BLD AUTO: 8.4 K/UL (ref 4.3–11)

## 2020-05-30 RX ADMIN — SODIUM CHLORIDE PRN MLS/HR: 9 INJECTION, SOLUTION INTRAVENOUS at 15:26

## 2020-05-30 RX ADMIN — BUPROPION HYDROCHLORIDE SCH MG: 150 TABLET, EXTENDED RELEASE ORAL at 09:20

## 2020-05-30 RX ADMIN — METOPROLOL TARTRATE SCH MG: 25 TABLET, FILM COATED ORAL at 21:21

## 2020-05-30 RX ADMIN — FOLIC ACID SCH MG: 1 TABLET ORAL at 09:20

## 2020-05-30 RX ADMIN — ENOXAPARIN SODIUM SCH MG: 30 INJECTION SUBCUTANEOUS at 10:30

## 2020-05-30 RX ADMIN — ANASTROZOLE SCH MG: 1 TABLET ORAL at 09:20

## 2020-05-30 RX ADMIN — METOPROLOL TARTRATE SCH MG: 25 TABLET, FILM COATED ORAL at 10:00

## 2020-05-30 RX ADMIN — METOPROLOL TARTRATE SCH MG: 25 TABLET, FILM COATED ORAL at 21:00

## 2020-05-30 RX ADMIN — PREGABALIN SCH MG: 25 CAPSULE ORAL at 12:53

## 2020-05-30 RX ADMIN — SODIUM CHLORIDE PRN MLS/HR: 9 INJECTION, SOLUTION INTRAVENOUS at 01:07

## 2020-05-30 RX ADMIN — Medication SCH OZ: at 09:21

## 2020-05-30 RX ADMIN — PREGABALIN SCH MG: 25 CAPSULE ORAL at 09:20

## 2020-05-30 RX ADMIN — PREGABALIN SCH MG: 25 CAPSULE ORAL at 17:31

## 2020-05-30 RX ADMIN — TRAMADOL HYDROCHLORIDE PRN MG: 50 TABLET, FILM COATED ORAL at 15:23

## 2020-05-30 RX ADMIN — PANTOPRAZOLE SODIUM SCH MG: 40 TABLET, DELAYED RELEASE ORAL at 09:20

## 2020-05-30 RX ADMIN — LEVOTHYROXINE SODIUM SCH MCG: 50 TABLET ORAL at 09:20

## 2020-05-30 NOTE — NUR
MS RN NOTES 



PATIENT IN BED RESTING NO SOB OR ACUTE DISTRESS NOTED. ALL DUE MEDICATIONS ADMINISTERED. ALL 
NEEDS MET. NO ACUTE CHANGES NOTED. WILL ENDORSE CARE TO PM SHIFT.

## 2020-05-30 NOTE — NUR
MS RN NOTES

RECEIVED ON BED,ON RIGHT SIDE POSITION,BREATHING REGULAR,NOT IN ANY FORM OF DISTRESS.O2 2LNC 
IN USED TO KEEP O2 SAT ABOVE 90%.IVF NS AT 100ML/HR RATE,SITE PATENT.PER REPORT,SHE ONLY 
VOIDED ONCE ON DIAPER,WITH SMALL AMOUNT OF URINE.BLADDER SCAN RENDERED AND THERE WAS ONLY 
83ML OF URINE,BLADDER NON DISTENDED.REPOSITION PER PROTOCOL.CALL LIGHT IN REACH,NEEDS 
ANTICIPATED.

## 2020-05-30 NOTE — NUR
MS RN OPENING NOTES



RECEIVED PATIENT IN BED, ASLEEP. PATIENT IS ON OXYGEN THERAPY AT 3 LPM VIA NASAL CANNULA; 
BREATHING IS EVEN AND UNLABORED; NO SOB NOTED AT THIS TIME. NO S/S OF PAIN SUCH AS MOANING, 
FACIAL GRIMACING OR GUARDING. LFA IV ACCESS G # 22 PRESENT AND INTACT INFUSING NS  
MLS/HR. SAFETY PRECAUTIONS IN PLACE; BED IN LOW POSITION AND LOCKED, RAILS UP X2, CALL LIGHT 
WITHIN REACH.. WILL CONTINUE TO MONITOR PATIENT.

## 2020-05-30 NOTE — NUR
MS YANG NOTES



FURTHER PATIENT CARE HANDED OUT TO CELIA CHUNG. 

-------------------------------------------------------------------------------

Addendum: 05/30/20 at 1625 by JESI CAMPOS RN

-------------------------------------------------------------------------------

MS YANG NOTES



FURTHER PATIENT CARE HANDED OUT TO CELIA HARRY.

## 2020-05-30 NOTE — NUR
MS RN NOTES

STRAIGHT CATH PERFORMED ON PATIENT PER DR. CARBALLO'S ORDERS FOR URINALYSIS. 300 ML OF 
MADDIE URINE COLLECTED.

## 2020-05-30 NOTE — NUR
MS RN NOTES



PATIENT WANTS TO SEE HER DOCTORS AND RECEIVE A PLAN FROM THEM. ALSO SHE WANTS TO KNOW FROM 
THE DOCTOR WHY SHE IS BEING PRESCRIBED ENOXAPARIN EVEN AFTER TEACHING WAS PROVIDED. SHE IS 
NOT HAPPY THAT FOR A FEW DAYS NOW SHE HEARS SAME THINGS OVER AND OVER AGAIN.

## 2020-05-31 VITALS — SYSTOLIC BLOOD PRESSURE: 120 MMHG | DIASTOLIC BLOOD PRESSURE: 71 MMHG

## 2020-05-31 VITALS — DIASTOLIC BLOOD PRESSURE: 72 MMHG | SYSTOLIC BLOOD PRESSURE: 124 MMHG

## 2020-05-31 VITALS — DIASTOLIC BLOOD PRESSURE: 65 MMHG | SYSTOLIC BLOOD PRESSURE: 123 MMHG

## 2020-05-31 LAB
APPEARANCE UR: CLEAR
BASOPHILS # BLD AUTO: 0 /CMM (ref 0–0.2)
BASOPHILS NFR BLD AUTO: 0.1 % (ref 0–2)
BILIRUB UR QL STRIP: NEGATIVE
BUN SERPL-MCNC: 40 MG/DL (ref 7–18)
CALCIUM SERPL-MCNC: 7.4 MG/DL (ref 8.5–10.1)
CHLORIDE SERPL-SCNC: 100 MMOL/L (ref 98–107)
CO2 SERPL-SCNC: 22 MMOL/L (ref 21–32)
COLOR UR: YELLOW
CREAT SERPL-MCNC: 4.9 MG/DL (ref 0.6–1.3)
EOSINOPHIL NFR BLD AUTO: 0.2 % (ref 0–6)
GLUCOSE SERPL-MCNC: 90 MG/DL (ref 74–106)
GLUCOSE UR STRIP-MCNC: NEGATIVE MG/DL
HCT VFR BLD AUTO: 30 % (ref 33–45)
HGB BLD-MCNC: 9.8 G/DL (ref 11.5–14.8)
HGB UR QL STRIP: (no result) ERY/UL
KETONES UR STRIP-MCNC: NEGATIVE MG/DL
LEUKOCYTE ESTERASE UR QL STRIP: NEGATIVE
LYMPHOCYTES NFR BLD AUTO: 0.5 /CMM (ref 0.8–4.8)
LYMPHOCYTES NFR BLD AUTO: 7.5 % (ref 20–44)
MAGNESIUM SERPL-MCNC: 1.9 MG/DL (ref 1.8–2.4)
MCHC RBC AUTO-ENTMCNC: 33 G/DL (ref 31–36)
MCV RBC AUTO: 95 FL (ref 82–100)
MONOCYTES NFR BLD AUTO: 0.6 /CMM (ref 0.1–1.3)
MONOCYTES NFR BLD AUTO: 8.3 % (ref 2–12)
NEUTROPHILS # BLD AUTO: 6 /CMM (ref 1.8–8.9)
NEUTROPHILS NFR BLD AUTO: 83.9 % (ref 43–81)
NITRITE UR QL STRIP: NEGATIVE
PH UR STRIP: 5 [PH] (ref 5–8)
PHOSPHATE SERPL-MCNC: 5.6 MG/DL (ref 2.5–4.9)
PLATELET # BLD AUTO: 385 /CMM (ref 150–450)
POTASSIUM SERPL-SCNC: 4.9 MMOL/L (ref 3.5–5.1)
PROT UR QL STRIP: NEGATIVE MG/DL
RBC # BLD AUTO: 3.15 MIL/UL (ref 4–5.2)
SODIUM SERPL-SCNC: 134 MMOL/L (ref 136–145)
UROBILINOGEN UR STRIP-MCNC: 0.2 EU/DL
WBC NRBC COR # BLD AUTO: 7.1 K/UL (ref 4.3–11)

## 2020-05-31 RX ADMIN — METOPROLOL TARTRATE SCH MG: 25 TABLET, FILM COATED ORAL at 08:30

## 2020-05-31 RX ADMIN — LEVOTHYROXINE SODIUM SCH MCG: 50 TABLET ORAL at 08:08

## 2020-05-31 RX ADMIN — METOPROLOL TARTRATE SCH MG: 25 TABLET, FILM COATED ORAL at 18:00

## 2020-05-31 RX ADMIN — BUPROPION HYDROCHLORIDE SCH MG: 150 TABLET, EXTENDED RELEASE ORAL at 08:09

## 2020-05-31 RX ADMIN — SODIUM CHLORIDE PRN MLS/HR: 9 INJECTION, SOLUTION INTRAVENOUS at 01:03

## 2020-05-31 RX ADMIN — ANASTROZOLE SCH MG: 1 TABLET ORAL at 08:08

## 2020-05-31 RX ADMIN — FOLIC ACID SCH MG: 1 TABLET ORAL at 08:08

## 2020-05-31 RX ADMIN — Medication SCH OZ: at 09:24

## 2020-05-31 RX ADMIN — TRAMADOL HYDROCHLORIDE PRN MG: 50 TABLET, FILM COATED ORAL at 04:47

## 2020-05-31 RX ADMIN — TRAMADOL HYDROCHLORIDE PRN MG: 50 TABLET, FILM COATED ORAL at 16:22

## 2020-05-31 RX ADMIN — METOPROLOL TARTRATE SCH MG: 25 TABLET, FILM COATED ORAL at 12:00

## 2020-05-31 RX ADMIN — PREGABALIN SCH MG: 25 CAPSULE ORAL at 12:23

## 2020-05-31 RX ADMIN — ENOXAPARIN SODIUM SCH MG: 30 INJECTION SUBCUTANEOUS at 08:31

## 2020-05-31 RX ADMIN — PREGABALIN SCH MG: 25 CAPSULE ORAL at 08:08

## 2020-05-31 RX ADMIN — PANTOPRAZOLE SODIUM SCH MG: 40 TABLET, DELAYED RELEASE ORAL at 08:08

## 2020-05-31 RX ADMIN — PREGABALIN SCH MG: 25 CAPSULE ORAL at 16:15

## 2020-05-31 NOTE — NUR
MS RN NOTES

NOTED BLADDER DISTENDED,BLADDER SCAN SHOWS 386 ML OF URINE IN THE BLADDER.PATIENT UNABLE TO 
VOID,C/O PAIN 5/10 OM PAIN SCALE.WILL MEDICATE.

## 2020-05-31 NOTE — NUR
MS RN NOTES



Awaiting for MD call back for IV access order due to multiple attempts unsuccessful. 
Endorsed to oncoming shift.

## 2020-05-31 NOTE — NUR
MS RN NOTES

ON BED, A/O X 2-3.PAIN MANAGEMENT EFFECTIVE.REPOSITION PER PROTOCOL.VITAL SIGNS STABLE.IN NO 
ACUTE DISTRESS.WILL ENDORSE TO DAY NURSE FOR ELYSSA.

## 2020-05-31 NOTE — NUR
RN CLOSING NOTE 



Patient is resting in bed, A/O x2-3 showing no signs of acute distress or SOB, saturaing 
>95% on 3L NC. Patient has no IV line. MD paged, awaiting for response. Denies any c/o pain 
nor discomfort at this time. Bed in lowest position, locked. Bed alarm on. Call light within 
reach. In no apparent distress.

## 2020-05-31 NOTE — NUR
RN NOTES



OK TO INSERT MIDLINE AS PER DR. MARAL WEBB MD. NURSING SUPERVISOR VICKIE AWARE, NO DEFINITE 
TIME WHEN PICC NURSE  WILL ARRIVE. WILL MONITOR.

## 2020-05-31 NOTE — NUR
RN NOTES



PATIENT RESTING COMFORTABLY IN BED.  3L/MIN VIA NC. NO S/S OF ACUTE RESPIRATORY DISTRESS. 
DENIES ANY C/O PAIN NOR DISCOMFORT AT THIS TIME. BED IN LOWEST POSITION, LOCKED. BED ALARM 
ON. CALL LIGHT WITHIN REACH. IN NO APPARENT DISTRESS. SAFETY MEASURES IN PLACE. NO IV ACCESS 
AT THIS TIME. WILL CALL DR. SAHRA CARBALLO TO OBTAIN ORDER FOR MIDLINE INSERTION. ALL 
NEEDS ANTICIPATED WILL CONTINUE TO MONITOR ACCORDINGLY.

## 2020-06-01 VITALS — SYSTOLIC BLOOD PRESSURE: 126 MMHG | DIASTOLIC BLOOD PRESSURE: 62 MMHG

## 2020-06-01 VITALS — SYSTOLIC BLOOD PRESSURE: 116 MMHG | DIASTOLIC BLOOD PRESSURE: 56 MMHG

## 2020-06-01 VITALS — SYSTOLIC BLOOD PRESSURE: 110 MMHG | DIASTOLIC BLOOD PRESSURE: 46 MMHG

## 2020-06-01 LAB
BASOPHILS # BLD AUTO: 0 /CMM (ref 0–0.2)
BASOPHILS NFR BLD AUTO: 0.4 % (ref 0–2)
BUN SERPL-MCNC: 46 MG/DL (ref 7–18)
CALCIUM SERPL-MCNC: 7.8 MG/DL (ref 8.5–10.1)
CHLORIDE SERPL-SCNC: 98 MMOL/L (ref 98–107)
CO2 SERPL-SCNC: 23 MMOL/L (ref 21–32)
CREAT SERPL-MCNC: 6.1 MG/DL (ref 0.6–1.3)
EOSINOPHIL NFR BLD AUTO: 0.4 % (ref 0–6)
GLUCOSE SERPL-MCNC: 105 MG/DL (ref 74–106)
HCT VFR BLD AUTO: 30 % (ref 33–45)
HGB BLD-MCNC: 9.7 G/DL (ref 11.5–14.8)
LYMPHOCYTES NFR BLD AUTO: 0.7 /CMM (ref 0.8–4.8)
LYMPHOCYTES NFR BLD AUTO: 11.2 % (ref 20–44)
MCHC RBC AUTO-ENTMCNC: 33 G/DL (ref 31–36)
MCV RBC AUTO: 95 FL (ref 82–100)
MONOCYTES NFR BLD AUTO: 0.5 /CMM (ref 0.1–1.3)
MONOCYTES NFR BLD AUTO: 8 % (ref 2–12)
NEUTROPHILS # BLD AUTO: 4.9 /CMM (ref 1.8–8.9)
NEUTROPHILS NFR BLD AUTO: 80 % (ref 43–81)
PLATELET # BLD AUTO: 358 /CMM (ref 150–450)
POTASSIUM SERPL-SCNC: 5.3 MMOL/L (ref 3.5–5.1)
PTH-INTACT SERPL-MCNC: 211 PG/ML (ref 15–65)
RBC # BLD AUTO: 3.11 MIL/UL (ref 4–5.2)
SODIUM SERPL-SCNC: 131 MMOL/L (ref 136–145)
WBC NRBC COR # BLD AUTO: 6.1 K/UL (ref 4.3–11)

## 2020-06-01 PROCEDURE — 06HM33Z INSERTION OF INFUSION DEVICE INTO RIGHT FEMORAL VEIN, PERCUTANEOUS APPROACH: ICD-10-PCS | Performed by: THORACIC SURGERY (CARDIOTHORACIC VASCULAR SURGERY)

## 2020-06-01 PROCEDURE — B54BZZA ULTRASONOGRAPHY OF RIGHT LOWER EXTREMITY VEINS, GUIDANCE: ICD-10-PCS | Performed by: THORACIC SURGERY (CARDIOTHORACIC VASCULAR SURGERY)

## 2020-06-01 PROCEDURE — 5A1D70Z PERFORMANCE OF URINARY FILTRATION, INTERMITTENT, LESS THAN 6 HOURS PER DAY: ICD-10-PCS | Performed by: INTERNAL MEDICINE

## 2020-06-01 RX ADMIN — METOPROLOL TARTRATE SCH MG: 25 TABLET, FILM COATED ORAL at 23:49

## 2020-06-01 RX ADMIN — PREGABALIN SCH MG: 25 CAPSULE ORAL at 17:51

## 2020-06-01 RX ADMIN — TRAMADOL HYDROCHLORIDE PRN MG: 50 TABLET, FILM COATED ORAL at 02:05

## 2020-06-01 RX ADMIN — HEPARIN SODIUM SCH UNITS: 5000 INJECTION INTRAVENOUS; SUBCUTANEOUS at 16:30

## 2020-06-01 RX ADMIN — ENOXAPARIN SODIUM SCH MG: 30 INJECTION SUBCUTANEOUS at 09:00

## 2020-06-01 RX ADMIN — BUPROPION HYDROCHLORIDE SCH MG: 150 TABLET, EXTENDED RELEASE ORAL at 08:51

## 2020-06-01 RX ADMIN — PANTOPRAZOLE SODIUM SCH MG: 40 TABLET, DELAYED RELEASE ORAL at 08:50

## 2020-06-01 RX ADMIN — Medication SCH OZ: at 08:52

## 2020-06-01 RX ADMIN — PREGABALIN SCH MG: 25 CAPSULE ORAL at 08:50

## 2020-06-01 RX ADMIN — METOPROLOL TARTRATE SCH MG: 25 TABLET, FILM COATED ORAL at 00:39

## 2020-06-01 RX ADMIN — FOLIC ACID SCH MG: 1 TABLET ORAL at 08:51

## 2020-06-01 RX ADMIN — PREGABALIN SCH MG: 25 CAPSULE ORAL at 13:10

## 2020-06-01 RX ADMIN — TRAMADOL HYDROCHLORIDE PRN MG: 50 TABLET, FILM COATED ORAL at 09:20

## 2020-06-01 RX ADMIN — METOPROLOL TARTRATE SCH MG: 25 TABLET, FILM COATED ORAL at 12:00

## 2020-06-01 RX ADMIN — METOPROLOL TARTRATE SCH MG: 25 TABLET, FILM COATED ORAL at 17:50

## 2020-06-01 RX ADMIN — ANASTROZOLE SCH MG: 1 TABLET ORAL at 08:52

## 2020-06-01 RX ADMIN — LEVOTHYROXINE SODIUM SCH MCG: 50 TABLET ORAL at 08:50

## 2020-06-01 RX ADMIN — METOPROLOL TARTRATE SCH MG: 25 TABLET, FILM COATED ORAL at 06:19

## 2020-06-01 NOTE — NUR
MS RN OPENING NOTES



RECEIVED PATIENT IN BED, AWAKE, A/O X3. PATIENT IS ON OXYGEN THERAPY AT 3LPM; BREATHING IS 
EVEN AND UNLABORED; NO SOB PRESENT AT THIS TIME. NO COMPLAINS OF PAIN. PATIENT IS MISSING IV 
ACCESS. PER NIGHT SHIFT NURSE AWAITING MIDLINE INCRETION. PER PATIENT MULTIPLE ATTEMPTS HAVE 
BEEN DONE AND SHE JUST REFUSED ANY MORE ATTEMPTS FOR PERIPHERAL IV. ARMS WITH MULTIPLE 
BRUISES. SAFETY PRECAUTIONS IN PLACE; BED IN LOW POSITION AND LOCKED, RAILS UP X2, CALL 
LIGHT WITHIN REACH. WILL CONTINUE TO MONITOR PATIENT.

## 2020-06-01 NOTE — NUR
MS/RN NOTES BLADDER DISTENDED, RETENTION OF URINE IDENTIFIED VIA BLADDER SCAN  ML,

WITH ORDER FOR URINE CATHETER AND TO CLARIFY IF MD WILL ORDER COBB CATHETER INSTEAD IN AM. 
PATIENT AGREED AND TOLERATED PROCEDURE.

## 2020-06-01 NOTE — NUR
MS/RN OPENING NOTES

RECEIVED PATIENT IN BED, RESTING COMFORTABLY, ON OXYGEN VIA NC AT 5 LITER, TO TAPER IT AS 
TOLERATED PER MD INSTRUCTION, PATIENT RESPIRATIONS EVEN AND UNLABORED. MID LINE ORDER 
AWAITING FOR PICC LINE NURSE. ALERT X2, REQUIRE ASSISTANCE WITH ADLS, OBSERVED WITH 
DISCOLORATION IN UPPER ARMS, HD CATHETER ON RU LEG, MONITORING FOR ANY CHANGES, FOR HD MAE 
AM , CONSENT SIGNED, WILL MONITOR AND CONTINUE CARE. RECEIVED ENDORSEMENT FROM AM RN FOR 
ELYSSA.

## 2020-06-01 NOTE — NUR
RN NOTES



ALL NEEDS ATTENDED AND MET,ABLE TO REST AND SLEPT AT INTERVALS, PATIENT DID NOT COMPLAIN OF 
DISTENDED BLADDER, REFUSED STRAIGHT CATH AT THIS TIME, ASSESSED, DIAPER SLIGHTLY WET. 
REPOSITIONED FOR COMFORT METOPROLOL  GIVEN AS SCHEDULED, /70, HR 85. ALL NEEDS 
ANTICIPATED, NO IV ACCESS WILL ENDORSE TO AM NURSE FOR CONTINUITY OF CARE AND FOLLOW UP 
MIDLINE INSERTION. PATIENT IS RESTING COMFORTABLY AT THIS TIME.

## 2020-06-01 NOTE — NUR
PER MD LAGUERRE TO USE STRAIGHT CATHETHER AT THIS TIME FOR BLADDER RETENTION TO AVOID ANY 
TYPE OF INFECTION.

## 2020-06-01 NOTE — NUR
MS RN NOTES



PATIENT JUST HAD AN HD CATH PLACED. SKIPPING THE DOSE DUE TO FRESH HD INSERTION. WILL 
CONTINUE TO MONITOR.

## 2020-06-01 NOTE — NUR
MS RN NOTES



PATIENT STATES DIFFICULTY BREATHING. O2 INCREASED TO 5 LPM VIA NASAL CANNULA, SATURATING AT 
92%. CHARGE NURSE AND MD NOTIFIED. WILL CONTINUE TO MONITOR.

## 2020-06-01 NOTE — NUR
MS RN CLOSING NOTES



PATIENT IN BED, ASLEEP. PATIENT IS ON OXYGEN THERAPY AT 5LPM; BREATHING IS EVEN AND 
UNLABORED; NO SOB PRESENT AT THIS TIME. NO COMPLAINS OF PAIN AT THE MOMENT. THROUGHOUT THE 
DAY PAIN TREATED WITH PRN MEDICATION. PATIENT IS MISSING IV ACCESS; STILL AWAITING MIDLINE 
INSERTION. PATIENT HAD AN HD CATH INSERTED TODAY FOR HEMODIALYSIS. ALL NEEDS ATTENDED TO 
THROUGHOUT THE DAY. SAFETY PRECAUTIONS REMAIN IN PLACE; BED IN LOW POSITION AND LOCKED, 
RAILS UP X2, CALL LIGHT WITHIN REACH. WILL ENDORSE TO NIGHT SHIFT NURSE.

## 2020-06-02 VITALS — SYSTOLIC BLOOD PRESSURE: 100 MMHG | DIASTOLIC BLOOD PRESSURE: 57 MMHG

## 2020-06-02 VITALS — DIASTOLIC BLOOD PRESSURE: 69 MMHG | SYSTOLIC BLOOD PRESSURE: 129 MMHG

## 2020-06-02 VITALS — SYSTOLIC BLOOD PRESSURE: 129 MMHG | DIASTOLIC BLOOD PRESSURE: 69 MMHG

## 2020-06-02 VITALS — DIASTOLIC BLOOD PRESSURE: 69 MMHG | SYSTOLIC BLOOD PRESSURE: 112 MMHG

## 2020-06-02 LAB
ALBUMIN SERPL ELPH-MCNC: 1.8 G/DL (ref 2.9–4.4)
ALBUMIN/GLOB SERPL: 0.6 {RATIO} (ref 0.7–1.7)
ALPHA1 GLOB SERPL ELPH-MCNC: 0.5 G/DL (ref 0–0.4)
ALPHA2 GLOB SERPL ELPH-MCNC: 1 G/DL (ref 0.4–1)
B-GLOBULIN SERPL ELPH-MCNC: 1.1 G/DL (ref 0.7–1.3)
BASOPHILS # BLD AUTO: 0 /CMM (ref 0–0.2)
BASOPHILS NFR BLD AUTO: 0.7 % (ref 0–2)
BUN SERPL-MCNC: 51 MG/DL (ref 7–18)
CALCIUM SERPL-MCNC: 8 MG/DL (ref 8.5–10.1)
CHLORIDE SERPL-SCNC: 98 MMOL/L (ref 98–107)
CO2 SERPL-SCNC: 24 MMOL/L (ref 21–32)
CREAT SERPL-MCNC: 6.9 MG/DL (ref 0.6–1.3)
EOSINOPHIL NFR BLD AUTO: 0.3 % (ref 0–6)
GAMMA GLOB SERPL ELPH-MCNC: 0.6 G/DL (ref 0.4–1.8)
GLOBULIN SER CALC-MCNC: 3.2 G/DL (ref 2.2–3.9)
GLUCOSE SERPL-MCNC: 85 MG/DL (ref 74–106)
HCT VFR BLD AUTO: 27 % (ref 33–45)
HGB BLD-MCNC: 8.9 G/DL (ref 11.5–14.8)
LYMPHOCYTES NFR BLD AUTO: 0.8 /CMM (ref 0.8–4.8)
LYMPHOCYTES NFR BLD AUTO: 12.9 % (ref 20–44)
MAGNESIUM SERPL-MCNC: 1.9 MG/DL (ref 1.8–2.4)
MCHC RBC AUTO-ENTMCNC: 34 G/DL (ref 31–36)
MCV RBC AUTO: 94 FL (ref 82–100)
MONOCYTES NFR BLD AUTO: 0.4 /CMM (ref 0.1–1.3)
MONOCYTES NFR BLD AUTO: 7.1 % (ref 2–12)
NEUTROPHILS # BLD AUTO: 4.6 /CMM (ref 1.8–8.9)
NEUTROPHILS NFR BLD AUTO: 79 % (ref 43–81)
PHOSPHATE SERPL-MCNC: 7 MG/DL (ref 2.5–4.9)
PLATELET # BLD AUTO: 322 /CMM (ref 150–450)
POTASSIUM SERPL-SCNC: 5.8 MMOL/L (ref 3.5–5.1)
RBC # BLD AUTO: 2.82 MIL/UL (ref 4–5.2)
SODIUM SERPL-SCNC: 132 MMOL/L (ref 136–145)
WBC NRBC COR # BLD AUTO: 5.8 K/UL (ref 4.3–11)

## 2020-06-02 RX ADMIN — METOPROLOL TARTRATE SCH MG: 25 TABLET, FILM COATED ORAL at 05:18

## 2020-06-02 RX ADMIN — METOPROLOL TARTRATE SCH MG: 25 TABLET, FILM COATED ORAL at 23:01

## 2020-06-02 RX ADMIN — PANTOPRAZOLE SODIUM SCH MG: 40 TABLET, DELAYED RELEASE ORAL at 09:29

## 2020-06-02 RX ADMIN — PREGABALIN SCH MG: 25 CAPSULE ORAL at 12:22

## 2020-06-02 RX ADMIN — PREGABALIN SCH MG: 25 CAPSULE ORAL at 16:14

## 2020-06-02 RX ADMIN — HEPARIN SODIUM SCH UNITS: 5000 INJECTION INTRAVENOUS; SUBCUTANEOUS at 09:00

## 2020-06-02 RX ADMIN — FOLIC ACID SCH MG: 1 TABLET ORAL at 09:29

## 2020-06-02 RX ADMIN — Medication SCH OZ: at 09:30

## 2020-06-02 RX ADMIN — METOPROLOL TARTRATE SCH MG: 25 TABLET, FILM COATED ORAL at 18:00

## 2020-06-02 RX ADMIN — LEVOTHYROXINE SODIUM SCH MCG: 50 TABLET ORAL at 10:34

## 2020-06-02 RX ADMIN — METOPROLOL TARTRATE SCH MG: 25 TABLET, FILM COATED ORAL at 12:22

## 2020-06-02 RX ADMIN — PREGABALIN SCH MG: 25 CAPSULE ORAL at 09:28

## 2020-06-02 RX ADMIN — TRAMADOL HYDROCHLORIDE PRN MG: 50 TABLET, FILM COATED ORAL at 09:40

## 2020-06-02 RX ADMIN — ANASTROZOLE SCH MG: 1 TABLET ORAL at 09:30

## 2020-06-02 RX ADMIN — HEPARIN SODIUM SCH UNITS: 5000 INJECTION INTRAVENOUS; SUBCUTANEOUS at 16:15

## 2020-06-02 RX ADMIN — BUPROPION HYDROCHLORIDE SCH MG: 150 TABLET, EXTENDED RELEASE ORAL at 09:29

## 2020-06-02 NOTE — NUR
MS RN OPENING NOTES



PATIENT RECEIVED RESTING IN BED COMFORTABLY; A/OX2; BREATHING EVEN AND UNLABORED; PATIENT 
TOLERATING 2L NC WELL; NO SOB NOTED; R UA MIDLINE INTACT AND PATENT; INFUSING NS @100ML/HR; 
PATIENT TOLERATING IVF WELL; R FEMORAL NORA IN PLACE, PER AM SHIFT, DIALYSIS NURSE 
REPORTED HAVING TROUBLE; DR. DESTINY COOK AWARE AND WILL ASSESS IN AM; SAFETY PRECAUTIONS 
IMPLEMENTED; BED LOCKED IN LOW POSITION; SIDE RAILS X2; CALL LIGHT WITHIN REACH; WILL CONT 
TO MONITOR

## 2020-06-02 NOTE — NUR
RN CLOSING NOTE 



Patient is resting in bed, A/O x2, showing no signs of acute distress or SOB, saturating 94% 
on 5L NC. CHAD midline noted running NS @ 100ml/hour. right groin HD cath noted. Tolu Dangelo 
will change HD cath in the morning. All patient needs met, all due medications given. Bed is 
in lowest position, side rails x3 in upright position, call light is within reach, fall 
safety and aspiration precautions enforced. WIll endorse to night shift.

## 2020-06-02 NOTE — NUR
309-1

MS/RN NOTES

PATIENT ABLE TO SLEEP DURING THE NIGHT, ON OXYGEN VIA NC AT 5 LITER WITH OXYGENATION OF 94%, 
ALERT, ORIENTED X3 ABLE TO VERBALIZE NEEDS, ATTENDED TO ALL NEEDS, KEPT COMFORTABLE. BED 
LOCKED, CALL LIGHTS WITHIN REACH. MONITORED URINE OUTPUT AND ORAL INTAKE. WILL ENDORSE TO AM 
RN FOR ELYSSA.

## 2020-06-03 VITALS — SYSTOLIC BLOOD PRESSURE: 153 MMHG | DIASTOLIC BLOOD PRESSURE: 72 MMHG

## 2020-06-03 VITALS — DIASTOLIC BLOOD PRESSURE: 64 MMHG | SYSTOLIC BLOOD PRESSURE: 130 MMHG

## 2020-06-03 VITALS — DIASTOLIC BLOOD PRESSURE: 61 MMHG | SYSTOLIC BLOOD PRESSURE: 108 MMHG

## 2020-06-03 VITALS — DIASTOLIC BLOOD PRESSURE: 59 MMHG | SYSTOLIC BLOOD PRESSURE: 117 MMHG

## 2020-06-03 VITALS — SYSTOLIC BLOOD PRESSURE: 143 MMHG | DIASTOLIC BLOOD PRESSURE: 88 MMHG

## 2020-06-03 VITALS — SYSTOLIC BLOOD PRESSURE: 102 MMHG | DIASTOLIC BLOOD PRESSURE: 43 MMHG

## 2020-06-03 VITALS — DIASTOLIC BLOOD PRESSURE: 44 MMHG | SYSTOLIC BLOOD PRESSURE: 121 MMHG

## 2020-06-03 VITALS — DIASTOLIC BLOOD PRESSURE: 56 MMHG | SYSTOLIC BLOOD PRESSURE: 133 MMHG

## 2020-06-03 LAB
BASOPHILS # BLD AUTO: 0.1 /CMM (ref 0–0.2)
BASOPHILS NFR BLD AUTO: 1.5 % (ref 0–2)
BUN SERPL-MCNC: 51 MG/DL (ref 7–18)
CALCIUM SERPL-MCNC: 7.7 MG/DL (ref 8.5–10.1)
CHLORIDE SERPL-SCNC: 100 MMOL/L (ref 98–107)
CO2 SERPL-SCNC: 21 MMOL/L (ref 21–32)
CREAT SERPL-MCNC: 6.4 MG/DL (ref 0.6–1.3)
EOSINOPHIL NFR BLD AUTO: 0.1 % (ref 0–6)
GLUCOSE SERPL-MCNC: 86 MG/DL (ref 74–106)
HCT VFR BLD AUTO: 24 % (ref 33–45)
HGB BLD-MCNC: 7.9 G/DL (ref 11.5–14.8)
LYMPHOCYTES NFR BLD AUTO: 0.6 /CMM (ref 0.8–4.8)
LYMPHOCYTES NFR BLD AUTO: 9.4 % (ref 20–44)
MCHC RBC AUTO-ENTMCNC: 33 G/DL (ref 31–36)
MCV RBC AUTO: 94 FL (ref 82–100)
MONOCYTES NFR BLD AUTO: 0.1 /CMM (ref 0.1–1.3)
MONOCYTES NFR BLD AUTO: 2.1 % (ref 2–12)
NEUTROPHILS # BLD AUTO: 5.3 /CMM (ref 1.8–8.9)
NEUTROPHILS NFR BLD AUTO: 86.9 % (ref 43–81)
PLATELET # BLD AUTO: 220 /CMM (ref 150–450)
POTASSIUM SERPL-SCNC: 5.2 MMOL/L (ref 3.5–5.1)
RBC # BLD AUTO: 2.55 MIL/UL (ref 4–5.2)
SODIUM SERPL-SCNC: 133 MMOL/L (ref 136–145)
WBC NRBC COR # BLD AUTO: 6.1 K/UL (ref 4.3–11)

## 2020-06-03 PROCEDURE — 06HY33Z INSERTION OF INFUSION DEVICE INTO LOWER VEIN, PERCUTANEOUS APPROACH: ICD-10-PCS | Performed by: NURSE PRACTITIONER

## 2020-06-03 RX ADMIN — FOLIC ACID SCH MG: 1 TABLET ORAL at 08:39

## 2020-06-03 RX ADMIN — METOPROLOL TARTRATE SCH MG: 25 TABLET, FILM COATED ORAL at 05:01

## 2020-06-03 RX ADMIN — ANASTROZOLE SCH MG: 1 TABLET ORAL at 08:39

## 2020-06-03 RX ADMIN — PREGABALIN SCH MG: 25 CAPSULE ORAL at 12:25

## 2020-06-03 RX ADMIN — METOPROLOL TARTRATE SCH MG: 25 TABLET, FILM COATED ORAL at 12:25

## 2020-06-03 RX ADMIN — LEVOTHYROXINE SODIUM SCH MCG: 50 TABLET ORAL at 08:40

## 2020-06-03 RX ADMIN — PREGABALIN SCH MG: 25 CAPSULE ORAL at 08:39

## 2020-06-03 RX ADMIN — HEPARIN SODIUM SCH UNITS: 5000 INJECTION INTRAVENOUS; SUBCUTANEOUS at 08:30

## 2020-06-03 RX ADMIN — HEPARIN SODIUM SCH UNITS: 5000 INJECTION INTRAVENOUS; SUBCUTANEOUS at 16:31

## 2020-06-03 RX ADMIN — PANTOPRAZOLE SODIUM SCH MG: 40 TABLET, DELAYED RELEASE ORAL at 08:39

## 2020-06-03 RX ADMIN — PREGABALIN SCH MG: 25 CAPSULE ORAL at 17:50

## 2020-06-03 RX ADMIN — FLUCONAZOLE SCH MG: 100 TABLET ORAL at 09:25

## 2020-06-03 RX ADMIN — Medication SCH OZ: at 08:40

## 2020-06-03 RX ADMIN — METOPROLOL TARTRATE SCH MG: 25 TABLET, FILM COATED ORAL at 17:43

## 2020-06-03 RX ADMIN — BUPROPION HYDROCHLORIDE SCH MG: 150 TABLET, EXTENDED RELEASE ORAL at 08:39

## 2020-06-03 NOTE — NUR
MS RN NOTE



PATIENT REFUSING LOVENOX 0900 DOSE. EXPLAINED RISKS VS BENEFITS. PATIENT CONTINUED TO 
REFUSE.

-------------------------------------------------------------------------------

Addendum: 06/03/20 at 0837 by DEBO FONTENOT RN

-------------------------------------------------------------------------------

MS RN NOTE



PATIENT REFUSING HEPARIN 0900 DOSE. NO LOVENOX SCHEDULED. EXPLAINED RISKS VS BENEFITS. 
PATIENT CONTINUED TO REFUSE.

## 2020-06-03 NOTE — NUR
MS RN NOTE



ORDER FROM DESTINY COOK FOR DIALYSIS RN TO REMOVE RIGHT HD CATH THAT IS NO LONGER PATENT AND 
INTACT. DURING DIALYSIS KANIKA RN REMOVED RIGHT HD CATH AND PATIENT BEGAN TO BLEED 
EXCESSIVELY. BLOOD SOAKED UP ONE BOX OF 4X4 GAUZE AND PARTIALLY BLED ON GOWN. PRESSURE WAS 
APPLIED WITH EXCESSIVE 4X4 GAUZE. MICROFOLD DRESSING IS APPLIED WITH PRESSURE DRESSING AND 
APPLIED WEIGHT ON TOP. BLEEDING IS CONTROLLED AT THIS TIME, NO BLEEDING NOTED AT THIS TIME. 
PATIENT BLOOD PRESSURE /43, NEXT 5 MINS /56, AND THEN 5 MINS AFTER 121/44. CBC 
WAS ORDERED STAT. DESTINY COOK WAS NOTIFIED AND MADE AWARE. PER DESTINY COOK KEEP PATIENT WITH 4X4 
GAUZE AND PRESSURE DRESSING AND WEIGHT. UNCOVER PATIENT FOR INSPECTION OF ANY BLEEDING. AND 
KEEP PATIENT WITH SITTER FOR AT LEAST FOUR MORE HOURS.THEN CALL DESTINY COOK IN 4 HOURS FOR 
UPDATE. WILL INFORM NIGHT SHIFT RN TO INFORM DESTINY. CHARGE NURSE DARIA AND SUPERVISOR ANGEL 
CAME AND ASSESSED PATIENT AND MADE AWARE. OR NURSE CAME AND ASSESSED PATIENT. PATIENT 
MAINTAINED ON A 1:1 SITTER TO MONITOR FOR BLEEDING.

## 2020-06-03 NOTE — NUR
MS RN CLOSING NOTE 



PATIENT IN BED RESTING COMFORTABLY AT THIS TIME. PATIENT BREATHING IS EVEN AND UNLABORED. 
PATIENT IN NO ACUTE DISTRESS. NO SOB NOTED. PATIENT SKIN WARM AND DRY AT THIS TIME. PATIENT 
WITH NO SIGNS OR SYMPTOMS OF BLEEDING ON RIGHT THIGH FROM RIGHT HD CATH REMOVAL AT THIS 
TIME. PATIENT FINISHED HEMODIALYSIS AT THIS TIME, AND TOLERATED WELL. PATIENT WITH  
AND THEN DAUGHTER TO SEE PATIENT FOR DISCUSSION OF HOSPICE, CHARGE NURSE DARIA AWARE AND DR. CARBALLO IS AWARE. PATIENT WITH ONE TO ONE SITTER AT THIS TIME TO MONITOR FOR SIGNS AND 
SYMPTOMS OF BLEEDING. WEIGHT STILL APPLIED AND PRESSURE DRESSING STILL INTACT. PATIENT 
TURNED AND REPOSITIONED Q2H. ENDORSED TO FOLLOW UP WITH STAT CBC THAT WAS ORDERED. PATIENT 
BED IS LOCKED AND IN LOWEST POSITION. CALL LIGHT WITHIN REACH. WILL ENDORSE CARE TO PM SHIFT 
FOR ELYSSA.

## 2020-06-03 NOTE — NUR
MS RN NOTE



PATIENT REFUSING HEPARIN 1700 DOSE AS ORDERED. EXPLAINED RISKS VS BENEFITS. PATIENT 
CONTINUED TO REFUSE.

## 2020-06-03 NOTE — NUR
MS RN CLOSING NOTES



PATIENT RESTING IN BED COMFORTABLY; BREATHING EVEN AND UNLABORED; PATIENT ON 5L NC, 
TOLERATING WELL; NO SOB NOTED; PATIENT A/OX2; CHAD MIDLINE INFUSING NS @ 100ML/HR; PATIENT 
TOLERATING IVF WELL; DR. DESTINY COOK WILL ASSESS HD SITE TODAY; BP MEDS GIVEN THIS SHIFT, NO 
HYPOTENSION NOTED; WILL INFORM DAY SHIFT; ALL NEEDS RENDERED; SAFETY PRECAUTIONS 
IMPLEMENTED; BED LOCKED IN LOW POSITION; SIDE RAILS X2; CALL LIGHT WITHIN REACH; WILL 
ENDORSE ELYSSA TO ONCOMING SHIFT

## 2020-06-03 NOTE — NUR
MS RN NOTE



PATIENT HEMODIALYSIS CATHETER PLACEMENT IN LEFT THIGH PLACED BY DESTINY COOK. PATIENT TOLERATED 
WELL. RIGHT THIGH HEMODIALYSIS CATHETER NOT WORKING PER DESTINY COOK. PATIENT IN NO ACUTE 
DISTRESS. WILL CONTINUE TO MONITOR.

## 2020-06-03 NOTE — NUR
MS RN OPENING NOTE



PATIENT IN BED RESTING COMFORTABLY. PATIENT IN NO ACUTE DISTRESS. NO SOB NOTED. PATIENT 
BREATHING IS EVEN AND UNLABORED. PATIENT STATES NO PAIN AT THIS TIME. SAFETY PRECAUTIONS IN 
PLACE. BED ALARM IS ON. PATIENT BED IS LOCKED AND IN LOWEST POSITION. CALL LIGHT WITHIN 
REACH. WILL CONTINUE TO MONITOR.

## 2020-06-03 NOTE — NUR
MS RN NOTES

DESTINY COOK MADE AWARE OF PATIENT'S CBC RESULTS. PER JOYCE, NO NEED FOR BLOOD TRANSFUSION.

## 2020-06-03 NOTE — NUR
MS RN NOTES

NO S/S OF BLEEDING NOTED. PRESSURE DRESSING REMAINS DRY AND INTACT; ADDED WEIGHT REMAINS IN 
PLACE ON RIGHT FEMORAL AREA. VITAL SIGNS- BP: 108/61 HR: 95 RR: 24 SPO2: 93%.

1 ON 1 SITTER REMAINS PRESENT AT THE BEDSIDE FOR CONTINUOUS MONITORING.

## 2020-06-03 NOTE — NUR
MS RN NOTE



PATIENT HAS BEEN RECEIVING ORAL FLUIDS. PATIENT IS A DIALYSIS PATIENT AND FLUIDS ON HOLD AT 
THIS TIME PER CHARGE NURSE DARIA.

## 2020-06-03 NOTE — NUR
MS RN OPENING NOTES

PATIENT AWAKE IN BED. A/OX2-3. FAMILY PRESENT AT THE BEDSIDE. NO BLEEDING NOTED ON RIGHT 
FEMORAL AREA; PRESSURE DRESSING REMAINS DRY AND INTACT; WEIGHT APPLIED ON RIGHT FEMORAL AREA 
WITH 2 BAGS OF IV FLUID FOR ADDED PRESSURE; 1 ON 1 SITTER PRESENT AT THE BEDSIDE TO MONITOR 
FOR BLEEDING. NO S/S OF ACUTE RESPIRATORY DISTRESS; BREATHING IS EVEN AND UNLABORED. MIDLINE 
PRESENT ON RIGHT UPPER ARM, INTACT & PATENT HEP LOCKED. SAFETY MEASURES IN PLACE. BED 
LOCKED, ALARM ON, SIDE RAILS X2, CALL LIGHT WITHIN. WILL CONTINUE TO MONITOR PATIENT FOR ANY 
BLEEDING.

## 2020-06-03 NOTE — NUR
MS RN NOTE



HELD LOPRESSOR 1800 DOSE AS ORDERED DUE TO PATIENT CURRENTLY UNDERGOING DIALYSIS AT THIS 
TIME.

## 2020-06-04 ENCOUNTER — HOSPITAL ENCOUNTER (INPATIENT)
Dept: HOSPITAL 54 - HOSPICE | Age: 85
LOS: 2 days | DRG: 177 | End: 2020-06-06
Attending: INTERNAL MEDICINE | Admitting: INTERNAL MEDICINE
Payer: COMMERCIAL

## 2020-06-04 VITALS — SYSTOLIC BLOOD PRESSURE: 111 MMHG | DIASTOLIC BLOOD PRESSURE: 56 MMHG

## 2020-06-04 VITALS — DIASTOLIC BLOOD PRESSURE: 54 MMHG | SYSTOLIC BLOOD PRESSURE: 106 MMHG

## 2020-06-04 VITALS — DIASTOLIC BLOOD PRESSURE: 88 MMHG | SYSTOLIC BLOOD PRESSURE: 156 MMHG

## 2020-06-04 VITALS — SYSTOLIC BLOOD PRESSURE: 123 MMHG | DIASTOLIC BLOOD PRESSURE: 87 MMHG

## 2020-06-04 VITALS — WEIGHT: 127 LBS | HEIGHT: 60 IN | BODY MASS INDEX: 24.94 KG/M2

## 2020-06-04 DIAGNOSIS — J18.9: ICD-10-CM

## 2020-06-04 DIAGNOSIS — B37.49: ICD-10-CM

## 2020-06-04 DIAGNOSIS — Z98.890: ICD-10-CM

## 2020-06-04 DIAGNOSIS — Z51.5: ICD-10-CM

## 2020-06-04 DIAGNOSIS — K44.9: ICD-10-CM

## 2020-06-04 DIAGNOSIS — C50.919: ICD-10-CM

## 2020-06-04 DIAGNOSIS — E87.1: ICD-10-CM

## 2020-06-04 DIAGNOSIS — I95.9: ICD-10-CM

## 2020-06-04 DIAGNOSIS — Z85.3: ICD-10-CM

## 2020-06-04 DIAGNOSIS — N18.6: ICD-10-CM

## 2020-06-04 DIAGNOSIS — J96.00: ICD-10-CM

## 2020-06-04 DIAGNOSIS — G93.41: ICD-10-CM

## 2020-06-04 DIAGNOSIS — I12.0: ICD-10-CM

## 2020-06-04 DIAGNOSIS — G89.4: ICD-10-CM

## 2020-06-04 DIAGNOSIS — I87.2: ICD-10-CM

## 2020-06-04 DIAGNOSIS — Z79.811: ICD-10-CM

## 2020-06-04 DIAGNOSIS — J90: ICD-10-CM

## 2020-06-04 DIAGNOSIS — N17.0: ICD-10-CM

## 2020-06-04 DIAGNOSIS — K21.9: ICD-10-CM

## 2020-06-04 DIAGNOSIS — J98.4: ICD-10-CM

## 2020-06-04 DIAGNOSIS — Z66: ICD-10-CM

## 2020-06-04 DIAGNOSIS — Z96.659: ICD-10-CM

## 2020-06-04 DIAGNOSIS — J91.8: ICD-10-CM

## 2020-06-04 DIAGNOSIS — I21.A1: ICD-10-CM

## 2020-06-04 DIAGNOSIS — Z79.899: ICD-10-CM

## 2020-06-04 DIAGNOSIS — J86.9: Primary | ICD-10-CM

## 2020-06-04 DIAGNOSIS — E43: ICD-10-CM

## 2020-06-04 DIAGNOSIS — M41.9: ICD-10-CM

## 2020-06-04 DIAGNOSIS — Z90.710: ICD-10-CM

## 2020-06-04 DIAGNOSIS — I51.7: ICD-10-CM

## 2020-06-04 DIAGNOSIS — H91.90: ICD-10-CM

## 2020-06-04 LAB
BASOPHILS # BLD AUTO: 0 /CMM (ref 0–0.2)
BASOPHILS NFR BLD AUTO: 0.7 % (ref 0–2)
BUN SERPL-MCNC: 38 MG/DL (ref 7–18)
CALCIUM SERPL-MCNC: 7.8 MG/DL (ref 8.5–10.1)
CHLORIDE SERPL-SCNC: 104 MMOL/L (ref 98–107)
CO2 SERPL-SCNC: 25 MMOL/L (ref 21–32)
CREAT SERPL-MCNC: 5 MG/DL (ref 0.6–1.3)
EOSINOPHIL NFR BLD AUTO: 0.3 % (ref 0–6)
GLUCOSE SERPL-MCNC: 85 MG/DL (ref 74–106)
HCT VFR BLD AUTO: 24 % (ref 33–45)
HCT VFR BLD AUTO: 26 % (ref 33–45)
HGB BLD-MCNC: 7.8 G/DL (ref 11.5–14.8)
HGB BLD-MCNC: 8.4 G/DL (ref 11.5–14.8)
LYMPHOCYTES NFR BLD AUTO: 0.7 /CMM (ref 0.8–4.8)
LYMPHOCYTES NFR BLD AUTO: 12.3 % (ref 20–44)
MAGNESIUM SERPL-MCNC: 1.9 MG/DL (ref 1.8–2.4)
MCHC RBC AUTO-ENTMCNC: 33 G/DL (ref 31–36)
MCV RBC AUTO: 94 FL (ref 82–100)
MONOCYTES NFR BLD AUTO: 0.3 /CMM (ref 0.1–1.3)
MONOCYTES NFR BLD AUTO: 6.6 % (ref 2–12)
NEUTROPHILS # BLD AUTO: 4.2 /CMM (ref 1.8–8.9)
NEUTROPHILS NFR BLD AUTO: 80.1 % (ref 43–81)
PHOSPHATE SERPL-MCNC: 6.2 MG/DL (ref 2.5–4.9)
PLATELET # BLD AUTO: 200 /CMM (ref 150–450)
POTASSIUM SERPL-SCNC: 4.6 MMOL/L (ref 3.5–5.1)
RBC # BLD AUTO: 2.54 MIL/UL (ref 4–5.2)
SODIUM SERPL-SCNC: 138 MMOL/L (ref 136–145)
WBC NRBC COR # BLD AUTO: 5.3 K/UL (ref 4.3–11)

## 2020-06-04 PROCEDURE — G0378 HOSPITAL OBSERVATION PER HR: HCPCS

## 2020-06-04 PROCEDURE — 05HY33Z INSERTION OF INFUSION DEVICE INTO UPPER VEIN, PERCUTANEOUS APPROACH: ICD-10-PCS | Performed by: INTERNAL MEDICINE

## 2020-06-04 RX ADMIN — HEPARIN SODIUM SCH UNITS: 5000 INJECTION INTRAVENOUS; SUBCUTANEOUS at 09:00

## 2020-06-04 RX ADMIN — Medication SCH OZ: at 09:38

## 2020-06-04 RX ADMIN — BUPROPION HYDROCHLORIDE SCH MG: 150 TABLET, EXTENDED RELEASE ORAL at 09:00

## 2020-06-04 RX ADMIN — METOPROLOL TARTRATE SCH MG: 25 TABLET, FILM COATED ORAL at 06:00

## 2020-06-04 RX ADMIN — PREGABALIN SCH MG: 25 CAPSULE ORAL at 13:00

## 2020-06-04 RX ADMIN — FOLIC ACID SCH MG: 1 TABLET ORAL at 09:00

## 2020-06-04 RX ADMIN — METOPROLOL TARTRATE SCH MG: 25 TABLET, FILM COATED ORAL at 12:00

## 2020-06-04 RX ADMIN — FLUCONAZOLE SCH MG: 100 TABLET ORAL at 09:00

## 2020-06-04 RX ADMIN — METOPROLOL TARTRATE SCH MG: 25 TABLET, FILM COATED ORAL at 17:05

## 2020-06-04 RX ADMIN — LEVOTHYROXINE SODIUM SCH MCG: 50 TABLET ORAL at 07:30

## 2020-06-04 RX ADMIN — METOPROLOL TARTRATE SCH MG: 25 TABLET, FILM COATED ORAL at 00:48

## 2020-06-04 RX ADMIN — HEPARIN SODIUM SCH UNITS: 5000 INJECTION INTRAVENOUS; SUBCUTANEOUS at 17:00

## 2020-06-04 RX ADMIN — PREGABALIN SCH MG: 25 CAPSULE ORAL at 17:00

## 2020-06-04 RX ADMIN — ANASTROZOLE SCH MG: 1 TABLET ORAL at 09:00

## 2020-06-04 RX ADMIN — PREGABALIN SCH MG: 25 CAPSULE ORAL at 09:00

## 2020-06-04 RX ADMIN — PANTOPRAZOLE SODIUM SCH MG: 40 TABLET, DELAYED RELEASE ORAL at 09:00

## 2020-06-04 NOTE — NUR
MS RN CLOSING NOTES

PATIENT SLEEPING IN BED. A/OX2-3. ON 3L NC. NO S/S OF ACUTE RESPIRATORY DISTRESS; BREATHING 
IS EVEN AND UNLABORED. NO BLEEDING NOTED ON RIGHT FEMORAL AREA; PRESSURE DRESSING REMAINS 
DRY AND INTACT; 1 ON 1 SITTER PRESENT AT THE BEDSIDE TO MONITOR FOR BLEEDING. MIDLINE 
PRESENT ON RIGHT UPPER ARM, INTACT & PATENT HEP LOCKED. SAFETY MEASURES IN PLACE AND 
PATIENT'S NEEDS MET. BED LOCKED, ALARM ON, SIDE RAILS X2, CALL LIGHT WITHIN. WILL ENDORSE TO 
DAY SHIFT NURSE PLAN OF CARE.

## 2020-06-04 NOTE — NUR
RN NOTES

MORPHINE DRIP ORDERS CLARIFIED WITH PHARMACIST, RECEIVED NEW STICKER TO INFUSE MEDICATION.

 PCA PUMP SET UP WITNESSED AND VERIFIED WITH CHARGE NURSE.

MORPHINE IV DRIP STARTED AT THIS TIME AS ORDERED. 1MG/HR INFUSING.

CURRENT VITAL SIGNS.111/56, 95, 18 ON 2 L VIA NC, AFEBRILE 98.7,96%.

 REMAINS COMFORTABLE AT THIS TIME, WILL CONTINUE TO MONITOR.

## 2020-06-04 NOTE — NUR
RN MS OPENING NOTES

RECEIVED PATIENT IN BED AWAKE ALERT X 1, RESPIRATION EVEN AND UNLABORED WITH EQUAL RISE AND 
FALL OF CHEST, AT THIS TIME APPEARS COMFORTABLE. CHAD MIDLINE INTACT, SITTER AT BEDSIDE .ALL 
NEEDS ATTENDED AT THIS TIME, WILL CONTINUE TO MONITOR AND FOLLOW MD ORDERS FOR ADMISSION TO 
[facility].

## 2020-06-04 NOTE — NUR
RN NOTES

 PATIENT ON DEDICATED HOSPICE CARE. HOSPITALIST AWARE OF DISCHARGE TO [facility].

## 2020-06-04 NOTE — NUR
rn notes

 patient on DNR/DNI  continues comfort care, stop  iv hydration, and medication, may have 
o2-2lnc,   morphine sulfate iv drip 1mg/ml/hr, atiavan 1 mg ml iv push for q 4hrprn for 
temperature >100, , agitation temporal soft restrain, acetaminophen 650 mg prn rectal q 6 
hr. order taken and carried out.  Family next to the bed, call light within to reach. 
endorsed oncoming nurse follow plan of care.

## 2020-06-04 NOTE — NUR
rn notes

 received patient in the bed  sleeping, arouse when called name or touched.  Patient 
sedated, unable to administered scheduled medication, no acute respiratory distress, o2-2l 
94 nc, iv access midline on right UA intact, v/s taken stable. Patient has PermCath on left 
groin intact,  old dressing intact on right groin, no bleeding noted.,  incontinent using 
diaper, call light within to reach, assist turn and reposition q 2 hr. call light within to 
reach.

## 2020-06-04 NOTE — NUR
RN HOSPICE NOTES 

PATIENT DISCHARGED FROM INPATIENT MED SURG AND TO BE ADMITTED IN [facility] WITH DEDICATED 
HOSPICE CARE. ORDER RECEIVED FROM HOSPITALIST JORGE WATKINS. NOTED AND CARRIED OUT.

ADMITTING ORDERS NOTED, VERIFIED  AND CARRIED OUT AND FAXED TO PHARMACY.

NEW ORDERS ARE 

ADMIT TO [facility] DEDICATED HOSPICE DX ESRD

DNR/DNI

CALL DEDICATED HOSPICE FOR ANY CHANGES @[phone], CALL TO TITRATE MORPHINE DRIP DOSE PER 
HOSPICE RN NO MAX RATE GIVEN WHEN CALLED TO CLARIFY.

D/C PREVIOUS ORAL AND IV MEDS D/C HYDRATION

MAY HAVE OXYGEN AT 2L/MIN VIA NASAL CANNULA CONTINUOUS FRO COMFORT/SOB

MORPHINE SULFATE IV DRIP 1MG/HR CONTINUOUS.

ATIVAN 1MG IV PUSH EVERY 4 HOURS PRN FOR AGITATION / TERMINAL RESTLESSNESS.

ACETAMINOPHEN SUPP. 650MG PER RECTUM Q 6HRS PRN FOR TEMP >100.

## 2020-06-05 VITALS — DIASTOLIC BLOOD PRESSURE: 79 MMHG | SYSTOLIC BLOOD PRESSURE: 129 MMHG

## 2020-06-05 VITALS — DIASTOLIC BLOOD PRESSURE: 41 MMHG | SYSTOLIC BLOOD PRESSURE: 90 MMHG

## 2020-06-05 VITALS — SYSTOLIC BLOOD PRESSURE: 131 MMHG | DIASTOLIC BLOOD PRESSURE: 61 MMHG

## 2020-06-05 VITALS — SYSTOLIC BLOOD PRESSURE: 80 MMHG | DIASTOLIC BLOOD PRESSURE: 42 MMHG

## 2020-06-05 NOTE — NUR
RN  NOTES

 CALLED DEDICATED HOSPICE TO CLARIFY FOR DIET PER DEDICATED HOSPICE STAFF WILL CALL BACK TO 
CLARIFY IF NPO STATUS OR NOT.

## 2020-06-05 NOTE — NUR
RN NOTES:

RECEIVED AWAKE WITH HER GRANDDAUGHTER BESIDE HER, A/O BUT LOOKS CONFUSE, SHE IS RESTLESS 
UPON ENDORSEMENT, SHE HAS MORPHINE DRIP ONGOING AT 2ML/HR,CHAD MIDLINE INTACT AND PATENT, ON 
02 AT 2-3L/MIN. SHE IS UNDER [facility], FALL,SAFETY AND ASPIRATION PRECAUTION 
OBSERVED, CALL LIGHT KEPT WITHIN EASY REACH.

## 2020-06-05 NOTE — NUR
RN NOTES:

LOOKS COMFORTABLE AND ASLEEP, NO PAIN OR DISCOMFORT NOTED, NOT GRIMACING NOR RESTLESS, HER 
GRANDDAUGHTER RICARDO JUST LEFT AND INSTRUCT US TO LET HER KNOW IF THERE IS ANY CHANGE OF 
CONDITION.

## 2020-06-05 NOTE — NUR
RN NOTES

PT SEEN BY DR. WATKINS, DAUGHTER AT BEDSIDE, PLAN OF CARE DISCUSSED WITH DAUGHTER, 
VERBALIZED UNDERSTANDING, FL MD MAY TITRATE MORPHINE DRIP TO 2MG/HR, NOTED AND CARRIED OUT.

## 2020-06-05 NOTE — NUR
RN HOSPICE CARE CLOSING NOTES

 PATIENT IN BED SLEEPING AROUSABLE TO TOUCH AT THIS TIME, OPEN EYES. REMAINED AFEBRILE THIS 
THROUGHOUT SHIFT. 0N 2 L VIA NC FOR COMFORT NO RESPIRATORY DISTRESS PRESENT AT THIS TIME, 
MORPHINE DRIP RUNNING AS ORDERED 1MG/HR APPEARS TO BE EFFECTIVE, REMAINED COMFORTABLE 
THROUGHOUT THE SHIFT, PATIENT REPOSITIONED FOR COMFORT.CHAD MIDLINE INTACT AND PATENT, NO 
REDNESS, NO INFILTRATION PRESENT.

HEELS OFFLOADED, DOES NOT APPEAR RESTLESSNESS AT THIS TIME, ALL NEEDS WERE ATTENDED REMAINS 
COMFORTABLE WILL CONTINUE TO MONITOR AND ENDORSE TO NEXT SHIFT.

 PER HOSPICE WILL CALL BACK TO CLARIFY NPO STATUS

## 2020-06-05 NOTE — NUR
RN NOTES

PT IN BED, ASLEEP, EASY TO AROUSE, NO SIGN OF PAIN OR DISTRESS, CALL LIGHT WITHIN REACH, ON 
MORPHINE DRIP AT 1MG/HR, KEPT PT WARM AND COMFORTABLE IN BED, REPOSITIONED FOR COMFORT.

## 2020-06-05 NOTE — NUR
RN NOTES

PT IN BED, RESTING, WITH EYES CLOSED, NO SIGN OF PAIN, NOT IN DISTRESS, ON O2 AT 2LPM VIA 
N/C, WITH O2 SAT OF 98%, ON MORPINE DRIP AT 2MG/HR, VISITED BY HOSPICE NURSE, PM CARE 
PROVIDED, REPOSITIONED FOR COMFORT, KEPT WARM IN BED.

## 2020-06-05 NOTE — NUR
RN NOTES:

SHE IS MORE RESTLESS AND HER BREATHING IS INCREASE,LOOKS ANXIOUS AND IN PAIN, SHE WAS GIVEN 
ATIVAN BY OUTGOING SHIFT, EXPLAINED TO HER GRANDDAUGHTER WILL WAIT A LITTLE AND SHE WILL 
CALM DOWN.

## 2020-06-05 NOTE — NUR
RN NOTES:

ON CLOSE FREQUENT CHECKED, ASLEEP, NON LABORED BREATHING, MORPHINE DRIP ONGOING, KEPT ON 
CLOSE WATCH.

## 2020-06-06 NOTE — NUR
MS/RN   Change in condition



Family contacted at 0727 to inform that patient's condition was declining, stated that they 
would be coming to the hospital.

## 2020-06-06 NOTE — NUR
RN NOTES:

RIGHT AFTER THE ENDORSEMENT, NOTED PATIENT BREATHING IS ABSENT, NO MORE RISE AND FALL OF THE 
ABDOMEN, FAINT CAROTID PULSE STARTED TO FAINT UNTIL WE ARE NOT ABLE TO PALPATE IT ANYMORE AT 
0720,INCOMING RN NOTIFY HOSPICE, RICARDO GRANDDAUGHTER WAS CALLED AGAIN AND NOTIFIED.

## 2020-06-06 NOTE — NUR
MS/RN   [facility]



[facility] called at 0728, spoke to Shannan to inform of patient's death. Per Shannan, 
need to call back when ready for mortuary to be contacted.

## 2020-06-06 NOTE — NUR
MS/RN   Pronounced



Patient found without heartbeat, respirations, unable to feel any pulses. Pupils fixed and 
dilated. Pronounced dead at 0720.

## 2020-06-06 NOTE — NUR
MS/RN   [facility]



[facility] called at 0737, spoke with Jacquelyn, patient declined for organ/tissue donation.

[phone]

## 2020-06-06 NOTE — NUR
MS/RN   [facility]



[place] on unit to  patient. All paperwork signed, representative made aware and 
shown that patient has gold colored ring with three white stones on left hand as this is 
unable to be removed. Family also aware.

All other personal belongings returned to patient's .

All questions answered, told to call back with any other concerns should they arise.

Patient left unit at [phone].

## 2020-06-06 NOTE — NUR
RN NOTES:

GASPING STATE, PERIODS OF APNEA NOTED, SHE HAS MOTTLING ON THE RIGHT AND LEFT HAND, ALSO 
STARTING ON RIGHT AND LEFT FOOT, REMAINS COMFORTABLE, MORPHINE DRIP CONTINUE, ENDORSED FOR 
CONTINUITY OF CARE. CALLED GRANDDAUGHTER RICARDO AND NOTIFY TO COME IF SHES ABLE BECAUSE HER 
GRANDMA STARTS TO DETERIORATE, BETTER IF SHE CAN COME NOW..